# Patient Record
Sex: MALE | Race: ASIAN | NOT HISPANIC OR LATINO | URBAN - METROPOLITAN AREA
[De-identification: names, ages, dates, MRNs, and addresses within clinical notes are randomized per-mention and may not be internally consistent; named-entity substitution may affect disease eponyms.]

---

## 2019-07-13 ENCOUNTER — INPATIENT (INPATIENT)
Facility: HOSPITAL | Age: 84
LOS: 3 days | Discharge: HOME | DRG: 300 | End: 2019-07-17
Attending: INTERNAL MEDICINE | Admitting: INTERNAL MEDICINE
Payer: MEDICARE

## 2019-07-13 VITALS
TEMPERATURE: 98 F | RESPIRATION RATE: 18 BRPM | WEIGHT: 160.06 LBS | HEIGHT: 66 IN | DIASTOLIC BLOOD PRESSURE: 77 MMHG | SYSTOLIC BLOOD PRESSURE: 121 MMHG | OXYGEN SATURATION: 95 % | HEART RATE: 89 BPM

## 2019-07-13 DIAGNOSIS — K92.2 GASTROINTESTINAL HEMORRHAGE, UNSPECIFIED: ICD-10-CM

## 2019-07-13 DIAGNOSIS — I82.409 ACUTE EMBOLISM AND THROMBOSIS OF UNSPECIFIED DEEP VEINS OF UNSPECIFIED LOWER EXTREMITY: ICD-10-CM

## 2019-07-13 DIAGNOSIS — Z98.890 OTHER SPECIFIED POSTPROCEDURAL STATES: Chronic | ICD-10-CM

## 2019-07-13 DIAGNOSIS — Z29.9 ENCOUNTER FOR PROPHYLACTIC MEASURES, UNSPECIFIED: ICD-10-CM

## 2019-07-13 DIAGNOSIS — E78.00 PURE HYPERCHOLESTEROLEMIA, UNSPECIFIED: ICD-10-CM

## 2019-07-13 DIAGNOSIS — I10 ESSENTIAL (PRIMARY) HYPERTENSION: ICD-10-CM

## 2019-07-13 DIAGNOSIS — N40.0 BENIGN PROSTATIC HYPERPLASIA WITHOUT LOWER URINARY TRACT SYMPTOMS: ICD-10-CM

## 2019-07-13 LAB
ALBUMIN SERPL ELPH-MCNC: 3.7 G/DL — SIGNIFICANT CHANGE UP (ref 3.3–5)
ALP SERPL-CCNC: 79 U/L — SIGNIFICANT CHANGE UP (ref 40–120)
ALT FLD-CCNC: 12 U/L — SIGNIFICANT CHANGE UP (ref 10–45)
ANION GAP SERPL CALC-SCNC: 13 MMOL/L — SIGNIFICANT CHANGE UP (ref 5–17)
APPEARANCE UR: CLEAR — SIGNIFICANT CHANGE UP
APTT BLD: 31.1 SEC — SIGNIFICANT CHANGE UP (ref 27.5–36.3)
AST SERPL-CCNC: 18 U/L — SIGNIFICANT CHANGE UP (ref 10–40)
BACTERIA # UR AUTO: NEGATIVE — SIGNIFICANT CHANGE UP
BASE EXCESS BLDV CALC-SCNC: 1.7 MMOL/L — SIGNIFICANT CHANGE UP (ref -2–2)
BASOPHILS # BLD AUTO: 0 K/UL — SIGNIFICANT CHANGE UP (ref 0–0.2)
BASOPHILS NFR BLD AUTO: 0.5 % — SIGNIFICANT CHANGE UP (ref 0–2)
BILIRUB SERPL-MCNC: 0.6 MG/DL — SIGNIFICANT CHANGE UP (ref 0.2–1.2)
BILIRUB UR-MCNC: NEGATIVE — SIGNIFICANT CHANGE UP
BLD GP AB SCN SERPL QL: NEGATIVE — SIGNIFICANT CHANGE UP
BUN SERPL-MCNC: 8 MG/DL — SIGNIFICANT CHANGE UP (ref 7–23)
CA-I SERPL-SCNC: 1.17 MMOL/L — SIGNIFICANT CHANGE UP (ref 1.12–1.3)
CALCIUM SERPL-MCNC: 9 MG/DL — SIGNIFICANT CHANGE UP (ref 8.4–10.5)
CHLORIDE BLDV-SCNC: 104 MMOL/L — SIGNIFICANT CHANGE UP (ref 96–108)
CHLORIDE SERPL-SCNC: 101 MMOL/L — SIGNIFICANT CHANGE UP (ref 96–108)
CO2 BLDV-SCNC: 27 MMOL/L — SIGNIFICANT CHANGE UP (ref 22–30)
CO2 SERPL-SCNC: 23 MMOL/L — SIGNIFICANT CHANGE UP (ref 22–31)
COLOR SPEC: SIGNIFICANT CHANGE UP
CREAT SERPL-MCNC: 0.66 MG/DL — SIGNIFICANT CHANGE UP (ref 0.5–1.3)
DIFF PNL FLD: NEGATIVE — SIGNIFICANT CHANGE UP
EOSINOPHIL # BLD AUTO: 0.1 K/UL — SIGNIFICANT CHANGE UP (ref 0–0.5)
EOSINOPHIL NFR BLD AUTO: 2.8 % — SIGNIFICANT CHANGE UP (ref 0–6)
EPI CELLS # UR: 1 /HPF — SIGNIFICANT CHANGE UP
GAS PNL BLDV: 133 MMOL/L — LOW (ref 135–145)
GAS PNL BLDV: SIGNIFICANT CHANGE UP
GAS PNL BLDV: SIGNIFICANT CHANGE UP
GLUCOSE BLDV-MCNC: 104 MG/DL — HIGH (ref 70–99)
GLUCOSE SERPL-MCNC: 111 MG/DL — HIGH (ref 70–99)
GLUCOSE UR QL: NEGATIVE — SIGNIFICANT CHANGE UP
HCO3 BLDV-SCNC: 26 MMOL/L — SIGNIFICANT CHANGE UP (ref 21–29)
HCT VFR BLD CALC: 41.4 % — SIGNIFICANT CHANGE UP (ref 39–50)
HCT VFR BLDA CALC: 44 % — SIGNIFICANT CHANGE UP (ref 39–50)
HGB BLD CALC-MCNC: 14.5 G/DL — SIGNIFICANT CHANGE UP (ref 13–17)
HGB BLD-MCNC: 14.7 G/DL — SIGNIFICANT CHANGE UP (ref 13–17)
HYALINE CASTS # UR AUTO: 0 /LPF — SIGNIFICANT CHANGE UP (ref 0–2)
INR BLD: 0.94 RATIO — SIGNIFICANT CHANGE UP (ref 0.88–1.16)
KETONES UR-MCNC: NEGATIVE — SIGNIFICANT CHANGE UP
LACTATE BLDV-MCNC: 1.3 MMOL/L — SIGNIFICANT CHANGE UP (ref 0.7–2)
LEUKOCYTE ESTERASE UR-ACNC: NEGATIVE — SIGNIFICANT CHANGE UP
LYMPHOCYTES # BLD AUTO: 1.1 K/UL — SIGNIFICANT CHANGE UP (ref 1–3.3)
LYMPHOCYTES # BLD AUTO: 20.7 % — SIGNIFICANT CHANGE UP (ref 13–44)
MCHC RBC-ENTMCNC: 34.5 PG — HIGH (ref 27–34)
MCHC RBC-ENTMCNC: 35.6 GM/DL — SIGNIFICANT CHANGE UP (ref 32–36)
MCV RBC AUTO: 97 FL — SIGNIFICANT CHANGE UP (ref 80–100)
MONOCYTES # BLD AUTO: 0.5 K/UL — SIGNIFICANT CHANGE UP (ref 0–0.9)
MONOCYTES NFR BLD AUTO: 10.4 % — SIGNIFICANT CHANGE UP (ref 2–14)
NEUTROPHILS # BLD AUTO: 3.4 K/UL — SIGNIFICANT CHANGE UP (ref 1.8–7.4)
NEUTROPHILS NFR BLD AUTO: 65.6 % — SIGNIFICANT CHANGE UP (ref 43–77)
NITRITE UR-MCNC: NEGATIVE — SIGNIFICANT CHANGE UP
OB PNL STL: NEGATIVE — SIGNIFICANT CHANGE UP
OTHER CELLS CSF MANUAL: 18 ML/DL — SIGNIFICANT CHANGE UP (ref 18–22)
PCO2 BLDV: 41 MMHG — SIGNIFICANT CHANGE UP (ref 35–50)
PH BLDV: 7.41 — SIGNIFICANT CHANGE UP (ref 7.35–7.45)
PH UR: 6 — SIGNIFICANT CHANGE UP (ref 5–8)
PLATELET # BLD AUTO: 238 K/UL — SIGNIFICANT CHANGE UP (ref 150–400)
PO2 BLDV: 58 MMHG — HIGH (ref 25–45)
POTASSIUM BLDV-SCNC: 4.8 MMOL/L — SIGNIFICANT CHANGE UP (ref 3.5–5.3)
POTASSIUM SERPL-MCNC: 4 MMOL/L — SIGNIFICANT CHANGE UP (ref 3.5–5.3)
POTASSIUM SERPL-SCNC: 4 MMOL/L — SIGNIFICANT CHANGE UP (ref 3.5–5.3)
PROT SERPL-MCNC: 6.2 G/DL — SIGNIFICANT CHANGE UP (ref 6–8.3)
PROT UR-MCNC: NEGATIVE — SIGNIFICANT CHANGE UP
PROTHROM AB SERPL-ACNC: 10.7 SEC — SIGNIFICANT CHANGE UP (ref 10–12.9)
RBC # BLD: 4.27 M/UL — SIGNIFICANT CHANGE UP (ref 4.2–5.8)
RBC # FLD: 12.9 % — SIGNIFICANT CHANGE UP (ref 10.3–14.5)
RBC CASTS # UR COMP ASSIST: 1 /HPF — SIGNIFICANT CHANGE UP (ref 0–4)
RH IG SCN BLD-IMP: POSITIVE — SIGNIFICANT CHANGE UP
RH IG SCN BLD-IMP: POSITIVE — SIGNIFICANT CHANGE UP
SAO2 % BLDV: 90 % — HIGH (ref 67–88)
SODIUM SERPL-SCNC: 137 MMOL/L — SIGNIFICANT CHANGE UP (ref 135–145)
SP GR SPEC: 1.01 — SIGNIFICANT CHANGE UP (ref 1.01–1.02)
UROBILINOGEN FLD QL: NEGATIVE — SIGNIFICANT CHANGE UP
WBC # BLD: 5.2 K/UL — SIGNIFICANT CHANGE UP (ref 3.8–10.5)
WBC # FLD AUTO: 5.2 K/UL — SIGNIFICANT CHANGE UP (ref 3.8–10.5)
WBC UR QL: 1 /HPF — SIGNIFICANT CHANGE UP (ref 0–5)

## 2019-07-13 PROCEDURE — 71045 X-RAY EXAM CHEST 1 VIEW: CPT | Mod: 26

## 2019-07-13 PROCEDURE — 74178 CT ABD&PLV WO CNTR FLWD CNTR: CPT | Mod: 26

## 2019-07-13 PROCEDURE — 93010 ELECTROCARDIOGRAM REPORT: CPT

## 2019-07-13 PROCEDURE — 99285 EMERGENCY DEPT VISIT HI MDM: CPT | Mod: GC

## 2019-07-13 RX ORDER — PRIMIDONE 250 MG/1
50 TABLET ORAL
Refills: 0 | Status: DISCONTINUED | OUTPATIENT
Start: 2019-07-13 | End: 2019-07-17

## 2019-07-13 RX ORDER — PANTOPRAZOLE SODIUM 20 MG/1
80 TABLET, DELAYED RELEASE ORAL ONCE
Refills: 0 | Status: COMPLETED | OUTPATIENT
Start: 2019-07-13 | End: 2019-07-13

## 2019-07-13 RX ORDER — HEPARIN SODIUM 5000 [USP'U]/ML
3000 INJECTION INTRAVENOUS; SUBCUTANEOUS EVERY 6 HOURS
Refills: 0 | Status: DISCONTINUED | OUTPATIENT
Start: 2019-07-13 | End: 2019-07-15

## 2019-07-13 RX ORDER — HEPARIN SODIUM 5000 [USP'U]/ML
6000 INJECTION INTRAVENOUS; SUBCUTANEOUS EVERY 6 HOURS
Refills: 0 | Status: DISCONTINUED | OUTPATIENT
Start: 2019-07-13 | End: 2019-07-15

## 2019-07-13 RX ORDER — HEPARIN SODIUM 5000 [USP'U]/ML
6000 INJECTION INTRAVENOUS; SUBCUTANEOUS EVERY 6 HOURS
Refills: 0 | Status: DISCONTINUED | OUTPATIENT
Start: 2019-07-13 | End: 2019-07-13

## 2019-07-13 RX ORDER — PANTOPRAZOLE SODIUM 20 MG/1
8 TABLET, DELAYED RELEASE ORAL
Qty: 80 | Refills: 0 | Status: DISCONTINUED | OUTPATIENT
Start: 2019-07-13 | End: 2019-07-14

## 2019-07-13 RX ORDER — HEPARIN SODIUM 5000 [USP'U]/ML
3000 INJECTION INTRAVENOUS; SUBCUTANEOUS EVERY 6 HOURS
Refills: 0 | Status: DISCONTINUED | OUTPATIENT
Start: 2019-07-13 | End: 2019-07-13

## 2019-07-13 RX ORDER — ONDANSETRON 8 MG/1
4 TABLET, FILM COATED ORAL EVERY 8 HOURS
Refills: 0 | Status: DISCONTINUED | OUTPATIENT
Start: 2019-07-13 | End: 2019-07-17

## 2019-07-13 RX ORDER — HYDROCHLOROTHIAZIDE 25 MG
12.5 TABLET ORAL DAILY
Refills: 0 | Status: DISCONTINUED | OUTPATIENT
Start: 2019-07-13 | End: 2019-07-13

## 2019-07-13 RX ORDER — TAMSULOSIN HYDROCHLORIDE 0.4 MG/1
0.4 CAPSULE ORAL AT BEDTIME
Refills: 0 | Status: DISCONTINUED | OUTPATIENT
Start: 2019-07-13 | End: 2019-07-17

## 2019-07-13 RX ORDER — MORPHINE SULFATE 50 MG/1
4 CAPSULE, EXTENDED RELEASE ORAL ONCE
Refills: 0 | Status: DISCONTINUED | OUTPATIENT
Start: 2019-07-13 | End: 2019-07-13

## 2019-07-13 RX ORDER — HEPARIN SODIUM 5000 [USP'U]/ML
INJECTION INTRAVENOUS; SUBCUTANEOUS
Qty: 25000 | Refills: 0 | Status: DISCONTINUED | OUTPATIENT
Start: 2019-07-13 | End: 2019-07-13

## 2019-07-13 RX ORDER — LOSARTAN POTASSIUM 100 MG/1
100 TABLET, FILM COATED ORAL DAILY
Refills: 0 | Status: DISCONTINUED | OUTPATIENT
Start: 2019-07-13 | End: 2019-07-17

## 2019-07-13 RX ORDER — ATORVASTATIN CALCIUM 80 MG/1
10 TABLET, FILM COATED ORAL AT BEDTIME
Refills: 0 | Status: DISCONTINUED | OUTPATIENT
Start: 2019-07-13 | End: 2019-07-17

## 2019-07-13 RX ORDER — AMLODIPINE BESYLATE 2.5 MG/1
10 TABLET ORAL AT BEDTIME
Refills: 0 | Status: DISCONTINUED | OUTPATIENT
Start: 2019-07-13 | End: 2019-07-13

## 2019-07-13 RX ORDER — HEPARIN SODIUM 5000 [USP'U]/ML
INJECTION INTRAVENOUS; SUBCUTANEOUS
Qty: 25000 | Refills: 0 | Status: DISCONTINUED | OUTPATIENT
Start: 2019-07-13 | End: 2019-07-15

## 2019-07-13 RX ORDER — ESCITALOPRAM OXALATE 10 MG/1
10 TABLET, FILM COATED ORAL DAILY
Refills: 0 | Status: DISCONTINUED | OUTPATIENT
Start: 2019-07-13 | End: 2019-07-17

## 2019-07-13 RX ADMIN — LOSARTAN POTASSIUM 100 MILLIGRAM(S): 100 TABLET, FILM COATED ORAL at 22:11

## 2019-07-13 RX ADMIN — MORPHINE SULFATE 4 MILLIGRAM(S): 50 CAPSULE, EXTENDED RELEASE ORAL at 15:27

## 2019-07-13 RX ADMIN — PANTOPRAZOLE SODIUM 80 MILLIGRAM(S): 20 TABLET, DELAYED RELEASE ORAL at 15:28

## 2019-07-13 RX ADMIN — TAMSULOSIN HYDROCHLORIDE 0.4 MILLIGRAM(S): 0.4 CAPSULE ORAL at 22:11

## 2019-07-13 RX ADMIN — ATORVASTATIN CALCIUM 10 MILLIGRAM(S): 80 TABLET, FILM COATED ORAL at 22:11

## 2019-07-13 RX ADMIN — MORPHINE SULFATE 4 MILLIGRAM(S): 50 CAPSULE, EXTENDED RELEASE ORAL at 16:00

## 2019-07-13 RX ADMIN — PANTOPRAZOLE SODIUM 10 MG/HR: 20 TABLET, DELAYED RELEASE ORAL at 23:00

## 2019-07-13 RX ADMIN — HEPARIN SODIUM 1300 UNIT(S)/HR: 5000 INJECTION INTRAVENOUS; SUBCUTANEOUS at 22:10

## 2019-07-13 RX ADMIN — PRIMIDONE 50 MILLIGRAM(S): 250 TABLET ORAL at 23:01

## 2019-07-13 NOTE — ED ADULT NURSE REASSESSMENT NOTE - NS ED NURSE REASSESS COMMENT FT1
KALEIGH Dooley made aware of patients corrected weight and need for new heparin order. NP acknowledged and will put in new order

## 2019-07-13 NOTE — H&P ADULT - PROBLEM SELECTOR PLAN 2
seen on CT   ? GIB  hold AC for now till GI eval  vascular eval for  possible IVC filter seen on CT   ? GIB  will start AC in view of acute DVt, monitor for bleeding and acute drop in Hg level

## 2019-07-13 NOTE — ED PROVIDER NOTE - ATTENDING CONTRIBUTION TO CARE
85M brought in by family for episodes of intermittent dark tarry stools x 1 week, per family he has had difficulty having BM but per granddaughter (who is a nurse) noted dark stools and stated patient complained of lower abdominal pain, mild, non-radiating, denies exacerbating/alleviating factors. Had similar episode in the past 2 years ago and was hospitalized for it. Does not take blood thinners.    ROS:  GEN: (-) fevers/chills, (-) weight loss, (-) fatigue/malaise  HEENT: (-) visual change, (-) photophobia  NECK: (-) stiffness, (-) swelling  RESP: (-) shortness of breath, (-) cough, (-) sputum  CV: (-) chest pain, (-) palpitations  GI: (-) nausea, (-) vomiting, (-) pain, (-) constipation, (-) diarrhea, (+) melena, (-) BRBPR  : (-) frequency/urgency, (-) hematuria, (-) dysuria, (-) incontinence, (-) discharge  EXT: (-) edema, (-) cyanosis  ENDO: (-) heat/cold intolerance, (-) polyuria  NEURO: (-) paresthesias, (-) weakness, (-) headache, (+) dizziness, (-) syncope  MSK: no recent trauma, no muscle pain     PMHx: HTN, HLD, prior episode of melena (reportedly had EGD/.colonoscophy 2 years ago per family), BPH, Depression  PSHx: Spinal surgery  Allergies: NKDA  SocHx: Denies ETOH/drugs/smoking    VITALS REVIEWED.    GENERALIZED APPEARANCE:  Comfortable, no acute distress, lying in bed, appears pale  SKIN:  Warm, dry, no cyanosis  HEAD:  No obvious scalp lesions  EYES:  Conjunctiva slightly pale, no icterus  ENMT:  Mucus membranes moist, no stridor, PERRL, EOMI  NECK:  Supple, non-tender  CHEST AND RESPIRATORY:  Clear to auscultation B/L, good air entry B/L, equal chest expansion  HEART AND CARDIOVASCULAR:  Regular rate, no obvious murmur  ABDOMEN AND GI:  Soft, non-tender, non-distended.  No rebound, no guarding  EXTREMITIES:  No deformity, edema, or calf tenderness  NEURO: AAOx3, gross motor and sensory intact    Impression:  R/o GIB, r/o anemia, r/o electrolyte abnormality, r/o demand ischemia  Labs, imaging/CTA, IV PPI, EKG, admit

## 2019-07-13 NOTE — ED PROVIDER NOTE - PHYSICAL EXAMINATION
GENERAL: no acute distress, non-toxic appearing  HEAD: normocephalic, atraumatic  HEENT: normal conjunctiva, oral mucosa moist, neck supple  CARDIAC: regular rate and rhythm, normal S1 and S2,  no appreciable murmurs  PULM: clear to ascultation bilaterally, no crackles, rales, rhonchi, or wheezing  GI: ttp over suprapubic region and RLQ  NEURO: alert and oriented x 3, normal speech, PERRLA, EOMI, no focal motor or sensory deficits  MSK: no visible deformities, no peripheral edema, calf tenderness/redness/swelling  SKIN: no visible rashes, dry, well-perfused  PSYCH: appropriate mood and affect

## 2019-07-13 NOTE — CHART NOTE - NSCHARTNOTEFT_GEN_A_CORE
Interval events  Patient s/p DVT and GIB  CBC stable, FOBT negative  Heparin gtt full AC started by Attending, Heparin gtt re ordered as patient's weight was entered incorrect before.  Monitor for Bleed  F/U 10 pm CXBC  Sign out to day NP    Karon Hodges FNP BC  [id number]

## 2019-07-13 NOTE — ED ADULT NURSE NOTE - OBJECTIVE STATEMENT
Patient presents to Ed with c/o abd back pain and dark stool. Patient with hx of chronic back pain due to meg placed 30 years ago  here due to dark stool one week ago and no bowel movement x 1 week. Patient A&Ox3 speaks Ukrainian, reports lightheadedness  and decreased po intake, denies chest pain +sob on exertion, no nausea vomiting fever chills headache or dizziness walks with a cane.  Lungs cta bilat abd soft with diffuse tenderness. Lt hand 20g iv lock placed labs drawn and sent.

## 2019-07-13 NOTE — H&P ADULT - NSICDXPASTMEDICALHX_GEN_ALL_CORE_FT
PAST MEDICAL HISTORY:  BPH (benign prostatic hyperplasia)     Depression     High cholesterol     Hypertension

## 2019-07-13 NOTE — H&P ADULT - NSHPLABSRESULTS_GEN_ALL_CORE
14.7   5.2   )-----------( 238      ( 2019 15:24 )             41.4               07-13    137  |  101  |  8   ----------------------------<  111<H>  4.0   |  23  |  0.66    Ca    9.0      2019 15:24    TPro  6.2  /  Alb  3.7  /  TBili  0.6  /  DBili  x   /  AST  18  /  ALT  12  /  AlkPhos  79  07-13    PT/INR - ( 2019 15:24 )   PT: 10.7 sec;   INR: 0.94 ratio         PTT - ( 2019 15:24 )  PTT:31.1 sec                   Urinalysis Basic - ( 2019 16:54 )    Color: Light Yellow / Appearance: Clear / S.010 / pH: x  Gluc: x / Ketone: Negative  / Bili: Negative / Urobili: Negative   Blood: x / Protein: Negative / Nitrite: Negative   Leuk Esterase: Negative / RBC: 1 /hpf / WBC 1 /HPF   Sq Epi: x / Non Sq Epi: 1 /hpf / Bacteria: Negative 14.7   5.2   )-----------( 238      ( 2019 15:24 )             41.4               07-13    137  |  101  |  8   ----------------------------<  111<H>  4.0   |  23  |  0.66    Ca    9.0      2019 15:24    TPro  6.2  /  Alb  3.7  /  TBili  0.6  /  DBili  x   /  AST  18  /  ALT  12  /  AlkPhos  79  07-13    PT/INR - ( 2019 15:24 )   PT: 10.7 sec;   INR: 0.94 ratio         PTT - ( 2019 15:24 )  PTT:31.1 sec                   Urinalysis Basic - ( 2019 16:54 )    Color: Light Yellow / Appearance: Clear / S.010 / pH: x  Gluc: x / Ketone: Negative  / Bili: Negative / Urobili: Negative   Blood: x / Protein: Negative / Nitrite: Negative   Leuk Esterase: Negative / RBC: 1 /hpf / WBC 1 /HPF   Sq Epi: x / Non Sq Epi: 1 /hpf / Bacteria: Negative    IMPRESSION:     No definite evidence of active gastrointestinal bleeding. However,   evaluation of the colon is somewhat limited as described above..    Celiac artery, SMA and JENNIE are patent    Deep venous thrombosis in the left external iliac vein.

## 2019-07-13 NOTE — H&P ADULT - HISTORY OF PRESENT ILLNESS
Pt is a 84 Y/O Male with hx of HTN, HLD presenting with intermittent black, tarry stools x 1 week with associated lower abdominal pain and nausea. No vomiting, fever, chills. No dysuria, frequency. No blood thinners. No chest pain, shortness of breath. Endorses intermittent lightheadedness. Has chronic back pain. Pt is a 86 Y/O Male with hx of HTN, HLD presenting with intermittent black, tarry stools x 1 week with associated lower abdominal pain and nausea. No vomiting, fever, chills. No dysuria, frequency. No blood thinners. No chest pain, shortness of breath. Endorses intermittent lightheadedness. Has chronic back pain. in the ER he was found to have DVT of LE with stable Hg level.

## 2019-07-13 NOTE — CONSULT NOTE ADULT - SUBJECTIVE AND OBJECTIVE BOX
Patient is a 85y Male     Patient is a 85y old  Male who presents with a chief complaint of GIB (13 Jul 2019 19:02)      HPI:  Pt is a 86 Y/O Male with hx of HTN, HLD presenting with intermittent black, tarry stools x 1 week with associated lower abdominal pain and nausea. No vomiting, fever, chills. No dysuria, frequency. No blood thinners. No chest pain, shortness of breath. Endorses intermittent lightheadedness. Has chronic back pain. in the ER he was found to have DVT of LE with stable Hg level. (13 Jul 2019 19:02)      PAST MEDICAL & SURGICAL HISTORY:  BPH (benign prostatic hyperplasia)  Depression  High cholesterol  Hypertension  H/O Spinal surgery      MEDICATIONS  (STANDING):  atorvastatin 10 milliGRAM(s) Oral at bedtime  escitalopram 10 milliGRAM(s) Oral daily  heparin  Infusion.  Unit(s)/Hr (11 mL/Hr) IV Continuous <Continuous>  losartan 100 milliGRAM(s) Oral daily  pantoprazole Infusion 8 mG/Hr (10 mL/Hr) IV Continuous <Continuous>  primidone 50 milliGRAM(s) Oral two times a day  tamsulosin 0.4 milliGRAM(s) Oral at bedtime      Allergies    No Known Allergies    Intolerances        SOCIAL HISTORY:  Denies ETOh,Smoking,     FAMILY HISTORY:      REVIEW OF SYSTEMS:    CONSTITUTIONAL: No weakness, fevers or chills  EYES/ENT: No visual changes;  No vertigo or throat pain   NECK: No pain or stiffness  RESPIRATORY: No cough, wheezing, hemoptysis; No shortness of breath  CARDIOVASCULAR: No chest pain or palpitations  GASTROINTESTINAL: No abdominal or epigastric pain. No nausea, vomiting, or hematemesis; No diarrhea or constipation. No melena or hematochezia.  GENITOURINARY: No dysuria, frequency or hematuria  NEUROLOGICAL: No numbness or weakness  SKIN: No itching, burning, rashes, or lesions   All other review of systems is negative unless indicated above.    VITAL:  T(C): , Max: 36.9 (07-13-19 @ 14:26)  T(F): , Max: 98.4 (07-13-19 @ 14:26)  HR: 78 (07-13-19 @ 19:33)  BP: 133/82 (07-13-19 @ 19:33)  BP(mean): --  RR: 18 (07-13-19 @ 19:33)  SpO2: 95% (07-13-19 @ 19:33)  Wt(kg): --    I and O's:    Height (cm): 167.64 (07-13 @ 14:26)  Weight (kg): 64.8 (07-13 @ 20:04)  BMI (kg/m2): 23.1 (07-13 @ 20:04)  BSA (m2): 1.73 (07-13 @ 20:04)    PHYSICAL EXAM:    Constitutional: NAD  HEENT: PERRLA,   Neck: No JVD  Respiratory: CTA B/L  Cardiovascular: S1 and S2  Gastrointestinal: BS+, soft, NT/ND  Extremities: No peripheral edema  Neurological: A/O x 3, no focal deficits  Psychiatric: Normal mood, normal affect  : No Yang  Skin: No rashes  Access: Not applicable  Back: No CVA tenderness    LABS:                        14.7   5.2   )-----------( 238      ( 13 Jul 2019 15:24 )             41.4     07-13    137  |  101  |  8   ----------------------------<  111<H>  4.0   |  23  |  0.66    Ca    9.0      13 Jul 2019 15:24    TPro  6.2  /  Alb  3.7  /  TBili  0.6  /  DBili  x   /  AST  18  /  ALT  12  /  AlkPhos  79  07-13          RADIOLOGY & ADDITIONAL STUDIES:

## 2019-07-13 NOTE — ED ADULT NURSE REASSESSMENT NOTE - NS ED NURSE REASSESS COMMENT FT1
Patient received from ROSMERY Almaraz. Patient a&ox3, no acute distress, resp nonlabored, resting comfortably in bed with family at bedside. Denies headache, dizziness, chest pain, palpitations, SOB, abd pain, N/V/D, urinary symptoms, fevers, chills, weakness at this time. Patient awaiting bed assignment. Pt placed in position of comfort. Pt educated on call bell system and provided call bell. Bed in lowest position, wheels locked, appropriate side rails raised. Pt denies needs at this time.

## 2019-07-13 NOTE — H&P ADULT - ASSESSMENT
Pt is a 86 Y/O Male with hx of HTN, HLD presenting with intermittent black, tarry stools x 1 week with associated lower abdominal pain and nausea. No vomiting, fever, chills. No dysuria, frequency. No blood thinners. No chest pain, shortness of breath. Endorses intermittent lightheadedness. Has chronic back pain. in the ER he was found to have DVT of LE with stable Hg level.

## 2019-07-13 NOTE — H&P ADULT - NSHPPHYSICALEXAM_GEN_ALL_CORE
Vital Signs Last 24 Hrs  T(C): 36.8 (13 Jul 2019 15:25), Max: 36.9 (13 Jul 2019 14:26)  T(F): 98.2 (13 Jul 2019 15:25), Max: 98.4 (13 Jul 2019 14:26)  HR: 91 (13 Jul 2019 15:25) (89 - 91)  BP: 129/84 (13 Jul 2019 15:25) (121/77 - 129/84)  BP(mean): --  RR: 18 (13 Jul 2019 15:25) (18 - 18)  SpO2: 94% (13 Jul 2019 15:25) (94% - 95%) Vital Signs Last 24 Hrs  T(C): 36.8 (13 Jul 2019 15:25), Max: 36.9 (13 Jul 2019 14:26)  T(F): 98.2 (13 Jul 2019 15:25), Max: 98.4 (13 Jul 2019 14:26)  HR: 91 (13 Jul 2019 15:25) (89 - 91)  BP: 129/84 (13 Jul 2019 15:25) (121/77 - 129/84)  BP(mean): --  RR: 18 (13 Jul 2019 15:25) (18 - 18)  SpO2: 94% (13 Jul 2019 15:25) (94% - 95%)    Appearance: Normal	  HEENT:   Normal oral mucosa, PERRL, EOMI	  Lymphatic: No lymphadenopathy , no edema  Cardiovascular: Normal S1 S2, No JVD, No murmurs , Peripheral pulses palpable 2+ bilaterally  Respiratory: Lungs clear to auscultation, normal effort 	  Gastrointestinal:  Soft, Non-tender, + BS	  Skin: No rashes, No ecchymoses, No cyanosis, warm to touch  Musculoskeletal: Normal range of motion, normal strength  Psychiatry:  Mood & affect appropriate  Ext: No edema

## 2019-07-13 NOTE — ED ADULT NURSE NOTE - NSIMPLEMENTINTERV_GEN_ALL_ED
Implemented All Fall with Harm Risk Interventions:  [place] to call system. Call bell, personal items and telephone within reach. Instruct patient to call for assistance. Room bathroom lighting operational. Non-slip footwear when patient is off stretcher. Physically safe environment: no spills, clutter or unnecessary equipment. Stretcher in lowest position, wheels locked, appropriate side rails in place. Provide visual cue, wrist band, yellow gown, etc. Monitor gait and stability. Monitor for mental status changes and reorient to person, place, and time. Review medications for side effects contributing to fall risk. Reinforce activity limits and safety measures with patient and family. Provide visual clues: red socks.

## 2019-07-13 NOTE — ED PROVIDER NOTE - CLINICAL SUMMARY MEDICAL DECISION MAKING FREE TEXT BOX
85M with hx of HTN, HLD presenting with intermittent black, tarry stools x 1 week with associated lower abdominal pain and nausea. Plan - basics, type and screen, guaic, cta a/p, protonix.

## 2019-07-13 NOTE — CONSULT NOTE ADULT - ASSESSMENT
shiloh is 3dsu nurse at bedside. no bm for three days. doubt gib and guiac negative. ppi and a/c approrpatie. we will followwith you  endocopic evaluation only if active bleeding

## 2019-07-13 NOTE — ED PROVIDER NOTE - PROGRESS NOTE DETAILS
Moses: spoke with PCP, Dr. Hobbs, at [phone], who advised admitting to hospitalist if patient requires it. mukul: admitted to [facility] for possible GIB, and newly diagnosed DVT. Stable for the floors.

## 2019-07-13 NOTE — ED ADULT NURSE NOTE - PMH
High cholesterol    Hypertension BPH (benign prostatic hyperplasia)    Depression    High cholesterol    Hypertension

## 2019-07-13 NOTE — H&P ADULT - PROBLEM SELECTOR PLAN 1
dark?black stoola s per family Hg stable  protonix drip  GI eval  Liquid diet   trend CBC  active type and screen dark?black stool as per family Hg stable  protonix drip  Stool occult negative   GI eval  Liquid diet   trend CBC  active type and screen

## 2019-07-13 NOTE — ED PROVIDER NOTE - OBJECTIVE STATEMENT
85M with hx of HTN, HLD presenting with intermittent black, tarry stools x 1 week with associated lower abdominal pain and nausea. No vomiting, fever, chills. No dysuria, frequency. No blood thinners. No chest pain, shortness of breath. Endorses intermittent lightheadedness. Has chronic back pain.

## 2019-07-13 NOTE — ED PROVIDER NOTE - NS ED ROS FT
GENERAL: no fever, chills  HEENT: no cough, congestion, odynophagia, dysphagia  CARDIAC: + lightheadedness  PULM: no dyspnea, wheezing   GI: + abdominal pain, nausea, melena  : no urinary dysuria, frequency, incontinence, hematuria  NEURO: no headache, motor weakness, sensory changes  MSK: + chronic back pain  SKIN: no rashes, hives  HEME: no active bleeding, bruising

## 2019-07-14 LAB
ALBUMIN SERPL ELPH-MCNC: 3.1 G/DL — LOW (ref 3.3–5)
ALP SERPL-CCNC: 65 U/L — SIGNIFICANT CHANGE UP (ref 40–120)
ALT FLD-CCNC: 10 U/L — SIGNIFICANT CHANGE UP (ref 10–45)
ANION GAP SERPL CALC-SCNC: 14 MMOL/L — SIGNIFICANT CHANGE UP (ref 5–17)
APTT BLD: 136.7 SEC — CRITICAL HIGH (ref 27.5–36.3)
APTT BLD: 154.8 SEC — CRITICAL HIGH (ref 27.5–36.3)
APTT BLD: 163.9 SEC — CRITICAL HIGH (ref 27.5–36.3)
AST SERPL-CCNC: 18 U/L — SIGNIFICANT CHANGE UP (ref 10–40)
BILIRUB SERPL-MCNC: 0.5 MG/DL — SIGNIFICANT CHANGE UP (ref 0.2–1.2)
BUN SERPL-MCNC: 7 MG/DL — SIGNIFICANT CHANGE UP (ref 7–23)
CALCIUM SERPL-MCNC: 8.7 MG/DL — SIGNIFICANT CHANGE UP (ref 8.4–10.5)
CHLORIDE SERPL-SCNC: 102 MMOL/L — SIGNIFICANT CHANGE UP (ref 96–108)
CHOLEST SERPL-MCNC: 120 MG/DL — SIGNIFICANT CHANGE UP (ref 10–199)
CO2 SERPL-SCNC: 21 MMOL/L — LOW (ref 22–31)
CREAT SERPL-MCNC: 0.6 MG/DL — SIGNIFICANT CHANGE UP (ref 0.5–1.3)
CULTURE RESULTS: SIGNIFICANT CHANGE UP
GLUCOSE SERPL-MCNC: 85 MG/DL — SIGNIFICANT CHANGE UP (ref 70–99)
HBA1C BLD-MCNC: 5.8 % — HIGH (ref 4–5.6)
HCT VFR BLD CALC: 36.8 % — LOW (ref 39–50)
HCT VFR BLD CALC: 37.2 % — LOW (ref 39–50)
HCT VFR BLD CALC: 37.2 % — LOW (ref 39–50)
HCT VFR BLD CALC: 38.1 % — LOW (ref 39–50)
HCT VFR BLD CALC: 40.4 % — SIGNIFICANT CHANGE UP (ref 39–50)
HDLC SERPL-MCNC: 49 MG/DL — SIGNIFICANT CHANGE UP
HGB BLD-MCNC: 12.3 G/DL — LOW (ref 13–17)
HGB BLD-MCNC: 12.6 G/DL — LOW (ref 13–17)
HGB BLD-MCNC: 13.1 G/DL — SIGNIFICANT CHANGE UP (ref 13–17)
HGB BLD-MCNC: 13.1 G/DL — SIGNIFICANT CHANGE UP (ref 13–17)
HGB BLD-MCNC: 13.3 G/DL — SIGNIFICANT CHANGE UP (ref 13–17)
LIPID PNL WITH DIRECT LDL SERPL: 63 MG/DL — SIGNIFICANT CHANGE UP
MCHC RBC-ENTMCNC: 28.5 PG — SIGNIFICANT CHANGE UP (ref 27–34)
MCHC RBC-ENTMCNC: 31.9 PG — SIGNIFICANT CHANGE UP (ref 27–34)
MCHC RBC-ENTMCNC: 31.9 PG — SIGNIFICANT CHANGE UP (ref 27–34)
MCHC RBC-ENTMCNC: 32.9 GM/DL — SIGNIFICANT CHANGE UP (ref 32–36)
MCHC RBC-ENTMCNC: 33.1 GM/DL — SIGNIFICANT CHANGE UP (ref 32–36)
MCHC RBC-ENTMCNC: 33.5 PG — SIGNIFICANT CHANGE UP (ref 27–34)
MCHC RBC-ENTMCNC: 34.1 PG — HIGH (ref 27–34)
MCHC RBC-ENTMCNC: 34.2 GM/DL — SIGNIFICANT CHANGE UP (ref 32–36)
MCHC RBC-ENTMCNC: 34.4 GM/DL — SIGNIFICANT CHANGE UP (ref 32–36)
MCHC RBC-ENTMCNC: 35.2 GM/DL — SIGNIFICANT CHANGE UP (ref 32–36)
MCV RBC AUTO: 86.7 FL — SIGNIFICANT CHANGE UP (ref 80–100)
MCV RBC AUTO: 93.2 FL — SIGNIFICANT CHANGE UP (ref 80–100)
MCV RBC AUTO: 96.4 FL — SIGNIFICANT CHANGE UP (ref 80–100)
MCV RBC AUTO: 97.1 FL — SIGNIFICANT CHANGE UP (ref 80–100)
MCV RBC AUTO: 97.2 FL — SIGNIFICANT CHANGE UP (ref 80–100)
PLATELET # BLD AUTO: 211 K/UL — SIGNIFICANT CHANGE UP (ref 150–400)
PLATELET # BLD AUTO: 224 K/UL — SIGNIFICANT CHANGE UP (ref 150–400)
PLATELET # BLD AUTO: 228 K/UL — SIGNIFICANT CHANGE UP (ref 150–400)
PLATELET # BLD AUTO: 234 K/UL — SIGNIFICANT CHANGE UP (ref 150–400)
PLATELET # BLD AUTO: 240 K/UL — SIGNIFICANT CHANGE UP (ref 150–400)
POTASSIUM SERPL-MCNC: 4 MMOL/L — SIGNIFICANT CHANGE UP (ref 3.5–5.3)
POTASSIUM SERPL-SCNC: 4 MMOL/L — SIGNIFICANT CHANGE UP (ref 3.5–5.3)
PROT SERPL-MCNC: 5.2 G/DL — LOW (ref 6–8.3)
RBC # BLD: 3.83 M/UL — LOW (ref 4.2–5.8)
RBC # BLD: 3.86 M/UL — LOW (ref 4.2–5.8)
RBC # BLD: 3.92 M/UL — LOW (ref 4.2–5.8)
RBC # BLD: 3.95 M/UL — LOW (ref 4.2–5.8)
RBC # BLD: 4.66 M/UL — SIGNIFICANT CHANGE UP (ref 4.2–5.8)
RBC # FLD: 12.5 % — SIGNIFICANT CHANGE UP (ref 10.3–14.5)
RBC # FLD: 12.7 % — SIGNIFICANT CHANGE UP (ref 10.3–14.5)
RBC # FLD: 13.2 % — SIGNIFICANT CHANGE UP (ref 10.3–14.5)
RBC # FLD: 13.2 % — SIGNIFICANT CHANGE UP (ref 10.3–14.5)
RBC # FLD: 13.3 % — SIGNIFICANT CHANGE UP (ref 10.3–14.5)
RH IG SCN BLD-IMP: POSITIVE — SIGNIFICANT CHANGE UP
SODIUM SERPL-SCNC: 137 MMOL/L — SIGNIFICANT CHANGE UP (ref 135–145)
SPECIMEN SOURCE: SIGNIFICANT CHANGE UP
TOTAL CHOLESTEROL/HDL RATIO MEASUREMENT: 2.5 RATIO — LOW (ref 3.4–9.6)
TRIGL SERPL-MCNC: 41 MG/DL — SIGNIFICANT CHANGE UP (ref 10–149)
TSH SERPL-MCNC: 1.64 UIU/ML — SIGNIFICANT CHANGE UP (ref 0.27–4.2)
WBC # BLD: 10.33 K/UL — SIGNIFICANT CHANGE UP (ref 3.8–10.5)
WBC # BLD: 4.36 K/UL — SIGNIFICANT CHANGE UP (ref 3.8–10.5)
WBC # BLD: 4.5 K/UL — SIGNIFICANT CHANGE UP (ref 3.8–10.5)
WBC # BLD: 4.59 K/UL — SIGNIFICANT CHANGE UP (ref 3.8–10.5)
WBC # BLD: 5 K/UL — SIGNIFICANT CHANGE UP (ref 3.8–10.5)
WBC # FLD AUTO: 10.33 K/UL — SIGNIFICANT CHANGE UP (ref 3.8–10.5)
WBC # FLD AUTO: 4.36 K/UL — SIGNIFICANT CHANGE UP (ref 3.8–10.5)
WBC # FLD AUTO: 4.5 K/UL — SIGNIFICANT CHANGE UP (ref 3.8–10.5)
WBC # FLD AUTO: 4.59 K/UL — SIGNIFICANT CHANGE UP (ref 3.8–10.5)
WBC # FLD AUTO: 5 K/UL — SIGNIFICANT CHANGE UP (ref 3.8–10.5)

## 2019-07-14 RX ORDER — ACETAMINOPHEN 500 MG
650 TABLET ORAL EVERY 6 HOURS
Refills: 0 | Status: DISCONTINUED | OUTPATIENT
Start: 2019-07-14 | End: 2019-07-17

## 2019-07-14 RX ORDER — OXYCODONE AND ACETAMINOPHEN 5; 325 MG/1; MG/1
1 TABLET ORAL EVERY 6 HOURS
Refills: 0 | Status: DISCONTINUED | OUTPATIENT
Start: 2019-07-14 | End: 2019-07-14

## 2019-07-14 RX ORDER — ACETAMINOPHEN 500 MG
650 TABLET ORAL ONCE
Refills: 0 | Status: COMPLETED | OUTPATIENT
Start: 2019-07-14 | End: 2019-07-14

## 2019-07-14 RX ORDER — MORPHINE SULFATE 50 MG/1
15 CAPSULE, EXTENDED RELEASE ORAL EVERY 6 HOURS
Refills: 0 | Status: DISCONTINUED | OUTPATIENT
Start: 2019-07-14 | End: 2019-07-17

## 2019-07-14 RX ORDER — PANTOPRAZOLE SODIUM 20 MG/1
40 TABLET, DELAYED RELEASE ORAL
Refills: 0 | Status: DISCONTINUED | OUTPATIENT
Start: 2019-07-14 | End: 2019-07-16

## 2019-07-14 RX ADMIN — Medication 650 MILLIGRAM(S): at 06:00

## 2019-07-14 RX ADMIN — PRIMIDONE 50 MILLIGRAM(S): 250 TABLET ORAL at 18:40

## 2019-07-14 RX ADMIN — Medication 650 MILLIGRAM(S): at 10:35

## 2019-07-14 RX ADMIN — MORPHINE SULFATE 15 MILLIGRAM(S): 50 CAPSULE, EXTENDED RELEASE ORAL at 13:30

## 2019-07-14 RX ADMIN — HEPARIN SODIUM 0 UNIT(S)/HR: 5000 INJECTION INTRAVENOUS; SUBCUTANEOUS at 12:36

## 2019-07-14 RX ADMIN — ATORVASTATIN CALCIUM 10 MILLIGRAM(S): 80 TABLET, FILM COATED ORAL at 21:18

## 2019-07-14 RX ADMIN — PRIMIDONE 50 MILLIGRAM(S): 250 TABLET ORAL at 06:01

## 2019-07-14 RX ADMIN — HEPARIN SODIUM 0 UNIT(S)/HR: 5000 INJECTION INTRAVENOUS; SUBCUTANEOUS at 20:21

## 2019-07-14 RX ADMIN — MORPHINE SULFATE 15 MILLIGRAM(S): 50 CAPSULE, EXTENDED RELEASE ORAL at 12:47

## 2019-07-14 RX ADMIN — PANTOPRAZOLE SODIUM 10 MG/HR: 20 TABLET, DELAYED RELEASE ORAL at 10:23

## 2019-07-14 RX ADMIN — TAMSULOSIN HYDROCHLORIDE 0.4 MILLIGRAM(S): 0.4 CAPSULE ORAL at 21:18

## 2019-07-14 RX ADMIN — HEPARIN SODIUM 0 UNIT(S)/HR: 5000 INJECTION INTRAVENOUS; SUBCUTANEOUS at 04:50

## 2019-07-14 RX ADMIN — Medication 650 MILLIGRAM(S): at 11:35

## 2019-07-14 RX ADMIN — ESCITALOPRAM OXALATE 10 MILLIGRAM(S): 10 TABLET, FILM COATED ORAL at 12:38

## 2019-07-14 RX ADMIN — PANTOPRAZOLE SODIUM 40 MILLIGRAM(S): 20 TABLET, DELAYED RELEASE ORAL at 18:40

## 2019-07-14 RX ADMIN — HEPARIN SODIUM 1100 UNIT(S)/HR: 5000 INJECTION INTRAVENOUS; SUBCUTANEOUS at 05:55

## 2019-07-14 RX ADMIN — HEPARIN SODIUM 900 UNIT(S)/HR: 5000 INJECTION INTRAVENOUS; SUBCUTANEOUS at 13:38

## 2019-07-14 RX ADMIN — HEPARIN SODIUM 700 UNIT(S)/HR: 5000 INJECTION INTRAVENOUS; SUBCUTANEOUS at 21:29

## 2019-07-14 RX ADMIN — LOSARTAN POTASSIUM 100 MILLIGRAM(S): 100 TABLET, FILM COATED ORAL at 06:01

## 2019-07-14 NOTE — PROGRESS NOTE ADULT - PROBLEM SELECTOR PLAN 2
seen on CT   Cont heparin drip   will start AC in view of acute DVt, monitor for bleeding and acute drop in Hg level

## 2019-07-14 NOTE — PROGRESS NOTE ADULT - ASSESSMENT
Pt is a 84 Y/O Male with hx of HTN, HLD presenting with intermittent black, tarry stools x 1 week with associated lower abdominal pain and nausea. No vomiting, fever, chills. No dysuria, frequency. No blood thinners. No chest pain, shortness of breath. Endorses intermittent lightheadedness. Has chronic back pain. in the ER he was found to have DVT of LE with stable Hg level.

## 2019-07-14 NOTE — PROGRESS NOTE ADULT - SUBJECTIVE AND OBJECTIVE BOX
Subjective: Patient seen and examined. No new events except as noted.   doing well  hg stable   back pain controlled with meds       REVIEW OF SYSTEMS:    CONSTITUTIONAL: No weakness, fevers or chills  EYES/ENT: No visual changes;  No vertigo or throat pain   NECK: No pain or stiffness  RESPIRATORY: No cough, wheezing, hemoptysis; No shortness of breath  CARDIOVASCULAR: No chest pain or palpitations  GASTROINTESTINAL: No abdominal or epigastric pain.   GENITOURINARY: No dysuria, frequency or hematuria  NEUROLOGICAL: No numbness or weakness  SKIN: No itching, burning, rashes, or lesions   All other review of systems is negative unless indicated above.    MEDICATIONS:  MEDICATIONS  (STANDING):  atorvastatin 10 milliGRAM(s) Oral at bedtime  escitalopram 10 milliGRAM(s) Oral daily  heparin  Infusion.  Unit(s)/Hr (13 mL/Hr) IV Continuous <Continuous>  losartan 100 milliGRAM(s) Oral daily  pantoprazole  Injectable 40 milliGRAM(s) IV Push two times a day  primidone 50 milliGRAM(s) Oral two times a day  tamsulosin 0.4 milliGRAM(s) Oral at bedtime      PHYSICAL EXAM:  T(C): 36.9 (19 @ 08:42), Max: 36.9 (19 @ 14:26)  HR: 96 (19 @ 08:42) (78 - 96)  BP: 117/76 (19 @ 08:42) (117/76 - 133/82)  RR: 18 (19 @ 08:42) (18 - 18)  SpO2: 94% (19 @ 08:42) (93% - 97%)  Wt(kg): --  I&O's Summary    2019 07:  -  2019 07:00  --------------------------------------------------------  IN: 833 mL / OUT: 900 mL / NET: -67 mL    2019 07:  -  2019 12:25  --------------------------------------------------------  IN: 240 mL / OUT: 125 mL / NET: 115 mL      Height (cm): 165.1 ( @ 21:06)  Weight (kg): 73 ( @ 21:06)  BMI (kg/m2): 26.8 ( @ 21:06)  BSA (m2): 1.8 ( @ 21:06)    Appearance: Normal	  HEENT:   Normal oral mucosa, PERRL, EOMI	  Lymphatic: No lymphadenopathy , no edema  Cardiovascular: Normal S1 S2, No JVD  Respiratory: Lungs clear to auscultation, normal effort 	  Gastrointestinal:  Soft, Non-tender, + BS	  Skin: No rashes, No ecchymoses, No cyanosis, warm to touch  Musculoskeletal: Normal range of motion, normal strength  Psychiatry:  Mood & affect appropriate  Ext: No edema      All labs, Imaging and EKGs personally reviewed                           12.3   4.59  )-----------( 234      ( 2019 09:53 )             37.2               07-    137  |  102  |  7   ----------------------------<  85  4.0   |  21<L>  |  0.60    Ca    8.7      2019 08:41    TPro  5.2<L>  /  Alb  3.1<L>  /  TBili  0.5  /  DBili  x   /  AST  18  /  ALT  10  /  AlkPhos  65  07-    PT/INR - ( 2019 15:24 )   PT: 10.7 sec;   INR: 0.94 ratio         PTT - ( 2019 04:07 )  PTT:163.9 sec                   Urinalysis Basic - ( 2019 16:54 )    Color: Light Yellow / Appearance: Clear / S.010 / pH: x  Gluc: x / Ketone: Negative  / Bili: Negative / Urobili: Negative   Blood: x / Protein: Negative / Nitrite: Negative   Leuk Esterase: Negative / RBC: 1 /hpf / WBC 1 /HPF   Sq Epi: x / Non Sq Epi: 1 /hpf / Bacteria: Negative

## 2019-07-14 NOTE — PROVIDER CONTACT NOTE (CRITICAL VALUE NOTIFICATION) - ACTION/TREATMENT ORDERED:
As per provider NP Mar, adhere to heparin nomogram protocol. Continue to monitor pt. As per provider KALEIGH Quinonez , adhere to heparin nomogram protocol. Continue to monitor pt.

## 2019-07-14 NOTE — PROGRESS NOTE ADULT - SUBJECTIVE AND OBJECTIVE BOX
LESLI YANG:[id number],   85yMale followed for:  No Known Allergies    PAST MEDICAL & SURGICAL HISTORY:  BPH (benign prostatic hyperplasia)  Depression  High cholesterol  Hypertension  H/O Spinal surgery    FAMILY HISTORY:    MEDICATIONS  (STANDING):  atorvastatin 10 milliGRAM(s) Oral at bedtime  escitalopram 10 milliGRAM(s) Oral daily  heparin  Infusion.  Unit(s)/Hr (13 mL/Hr) IV Continuous <Continuous>  losartan 100 milliGRAM(s) Oral daily  pantoprazole  Injectable 40 milliGRAM(s) IV Push two times a day  primidone 50 milliGRAM(s) Oral two times a day  tamsulosin 0.4 milliGRAM(s) Oral at bedtime    MEDICATIONS  (PRN):  acetaminophen   Tablet .. 650 milliGRAM(s) Oral every 6 hours PRN Mild Pain (1 - 3), Moderate Pain (4 - 6)  heparin  Injectable 6000 Unit(s) IV Push every 6 hours PRN For aPTT less than 40  heparin  Injectable 3000 Unit(s) IV Push every 6 hours PRN For aPTT between 40 - 57  morphine  IR 15 milliGRAM(s) Oral every 6 hours PRN Severe Pain (7 - 10)  ondansetron Injectable 4 milliGRAM(s) IV Push every 8 hours PRN Nausea and/or Vomiting      Vital Signs Last 24 Hrs  T(C): 36.8 (14 Jul 2019 16:18), Max: 36.9 (13 Jul 2019 19:33)  T(F): 98.2 (14 Jul 2019 16:18), Max: 98.5 (14 Jul 2019 08:42)  HR: 85 (14 Jul 2019 16:18) (78 - 96)  BP: 121/67 (14 Jul 2019 16:18) (117/76 - 133/82)  BP(mean): --  RR: 18 (14 Jul 2019 16:18) (18 - 18)  SpO2: 97% (14 Jul 2019 16:18) (93% - 97%)  nc/at  s1s2  cta  soft, nt, nd no guarding or rebound  no c/c/e    CBC Full  -  ( 14 Jul 2019 11:56 )  WBC Count : 10.33 K/uL  RBC Count : 4.66 M/uL  Hemoglobin : 13.3 g/dL  Hematocrit : 40.4 %  Platelet Count - Automated : 211 K/uL  Mean Cell Volume : 86.7 fl  Mean Cell Hemoglobin : 28.5 pg  Mean Cell Hemoglobin Concentration : 32.9 gm/dL  Auto Neutrophil # : x  Auto Lymphocyte # : x  Auto Monocyte # : x  Auto Eosinophil # : x  Auto Basophil # : x  Auto Neutrophil % : x  Auto Lymphocyte % : x  Auto Monocyte % : x  Auto Eosinophil % : x  Auto Basophil % : x    07-14    137  |  102  |  7   ----------------------------<  85  4.0   |  21<L>  |  0.60    Ca    8.7      14 Jul 2019 08:41    TPro  5.2<L>  /  Alb  3.1<L>  /  TBili  0.5  /  DBili  x   /  AST  18  /  ALT  10  /  AlkPhos  65  07-14    PT/INR - ( 13 Jul 2019 15:24 )   PT: 10.7 sec;   INR: 0.94 ratio         PTT - ( 14 Jul 2019 11:59 )  PTT:154.8 sec

## 2019-07-14 NOTE — CONSULT NOTE ADULT - SUBJECTIVE AND OBJECTIVE BOX
CHIEF COMPLAINT:Patient is a 85y old  Male who presents with a chief complaint of GIB (14 Jul 2019 19:15)      HISTORY OF PRESENT ILLNESS:HPI:  Pt is a 84 Y/O Male with hx of HTN, HLD presenting with intermittent black, tarry stools x 1 week with associated lower abdominal pain and nausea. No vomiting, fever, chills. No dysuria, frequency. No blood thinners. No chest pain, shortness of breath. Endorses intermittent lightheadedness. Has chronic back pain. in the ER he was found to have DVT of LE with stable Hg level. (13 Jul 2019 19:02)      PAST MEDICAL & SURGICAL HISTORY:  BPH (benign prostatic hyperplasia)  Depression  High cholesterol  Hypertension  H/O Spinal surgery          MEDICATIONS:  heparin  Infusion.  Unit(s)/Hr IV Continuous <Continuous>  heparin  Injectable 6000 Unit(s) IV Push every 6 hours PRN  heparin  Injectable 3000 Unit(s) IV Push every 6 hours PRN  losartan 100 milliGRAM(s) Oral daily  tamsulosin 0.4 milliGRAM(s) Oral at bedtime        acetaminophen   Tablet .. 650 milliGRAM(s) Oral every 6 hours PRN  escitalopram 10 milliGRAM(s) Oral daily  morphine  IR 15 milliGRAM(s) Oral every 6 hours PRN  ondansetron Injectable 4 milliGRAM(s) IV Push every 8 hours PRN  primidone 50 milliGRAM(s) Oral two times a day    pantoprazole  Injectable 40 milliGRAM(s) IV Push two times a day    atorvastatin 10 milliGRAM(s) Oral at bedtime        FAMILY HISTORY:      Non-contributory    SOCIAL HISTORY:    not an active smoker    Allergies    No Known Allergies    Intolerances    	    REVIEW OF SYSTEMS:  CONSTITUTIONAL: No fever  EYES: No eye pain, visual disturbances, or discharge  ENMT:  No difficulty hearing, tinnitus  NECK: No pain or stiffness  RESPIRATORY: No cough, wheezing,  CARDIOVASCULAR: No chest pain, palpitations, passing out, dizziness, or leg swelling  GASTROINTESTINAL:  See HPI.  GENITOURINARY: No dysuria, hematuria  NEUROLOGICAL: No stroke like symptoms  SKIN: No burning or lesions   ENDOCRINE: No heat or cold intolerance  MUSCULOSKELETAL: No joint pain or swelling  PSYCHIATRIC: No  anxiety, mood swings  HEME/LYMPH: No bleeding gums  ALLERY AND IMMUNOLOGIC: No hives or eczema	    All other ROS negative    PHYSICAL EXAM:  T(C): 36.5 (07-14-19 @ 20:46), Max: 36.9 (07-14-19 @ 00:54)  HR: 81 (07-14-19 @ 20:46) (80 - 96)  BP: 123/74 (07-14-19 @ 20:46) (117/76 - 126/77)  RR: 18 (07-14-19 @ 20:46) (18 - 18)  SpO2: 97% (07-14-19 @ 20:46) (93% - 97%)  Wt(kg): --  I&O's Summary    13 Jul 2019 07:01  -  14 Jul 2019 07:00  --------------------------------------------------------  IN: 833 mL / OUT: 900 mL / NET: -67 mL    14 Jul 2019 07:01  -  14 Jul 2019 22:52  --------------------------------------------------------  IN: 580 mL / OUT: 200 mL / NET: 380 mL        Appearance: Normal	  HEENT:   Normal oral mucosa, EOMI	  Cardiovascular: Normal S1 S2, No JVD, No murmurs  Respiratory: Lungs clear to auscultation	  Psychiatry: Alert  Gastrointestinal:  Soft, Non-tender, + BS	  Skin: No rashes   Neurologic: Non-focal  Extremities:  No edema  Vascular: Peripheral pulses palpable    	    	  	  CARDIAC MARKERS:                                  13.3   10.33 )-----------( 211      ( 14 Jul 2019 11:56 )             40.4     07-14    137  |  102  |  7   ----------------------------<  85  4.0   |  21<L>  |  0.60    Ca    8.7      14 Jul 2019 08:41    TPro  5.2<L>  /  Alb  3.1<L>  /  TBili  0.5  /  DBili  x   /  AST  18  /  ALT  10  /  AlkPhos  65  07-14          EKG: nsr, inferior wall q waves  Radiology:  < from: Xray Chest 1 View AP/PA (07.13.19 @ 17:50) >  IMPRESSION:     Mild pulmonary edema.    < end of copied text >      Assessment and Plan:   · Assessment		  Pt is a 84 Y/O Male with hx of HTN, HLD presenting with intermittent black, tarry stools x 1 week with associated lower abdominal pain and nausea. No vomiting, fever, chills. No dysuria, frequency. No blood thinners. No chest pain, shortness of breath. Endorses intermittent lightheadedness. Has chronic back pain. in the ER he was found to have DVT of LE with stable Hg level.      Problem/Plan - 1:  ·  Problem: DVT (deep venous thrombosis).  Plan: seen on CT   Cont heparin drip   Transition to NOAC prior to discharge  Likely 2/2 immobility, Heme consult for hypercoag state work up     Problem/Plan - 2:  ·  Problem: Hypertension.  Plan: c/w Losartan 100mg       Problem/Plan - 3:  ·  Problem: High cholesterol.  Plan: c/w statin.      Problem/Plan - 4:  ·  Problem: Pulm edema on CXR. Plan: Asx, Obtain TTE     Problem/Plan - 5:  Problem: Prophylactic measure. Plan: DVT and GI PPX   pain and nausea meds PRN.        Robert Robertson DO [facility]  [facility]  [phone]

## 2019-07-14 NOTE — PROGRESS NOTE ADULT - PROBLEM SELECTOR PLAN 1
dark?black stool as per family Hg stable  protonix changed to BID   Stool occult negative   GI eval appreciated   diet as tolerated, can be advanced   trend CBC  active type and screen

## 2019-07-14 NOTE — CHART NOTE - NSCHARTNOTEFT_GEN_A_CORE
H&H 14.7/41.4 on admission (7/13/19 at 3PM), checked on 7/14 4AM H&H 13.1/38.1, pt on heparin drip for DVT, but admitted with tarry stools, occult blood neg, no active bleeding at this time. D/w the Attending, Dr. Bashir, will recheck CBC at 10 AM, and 8PM. Will endorse to day team.       Daniela Hurd NP, #[id number]

## 2019-07-15 DIAGNOSIS — E04.1 NONTOXIC SINGLE THYROID NODULE: ICD-10-CM

## 2019-07-15 LAB
APTT BLD: 69.3 SEC — HIGH (ref 27.5–36.3)
APTT BLD: 71.4 SEC — HIGH (ref 27.5–36.3)
HCT VFR BLD CALC: 38.9 % — LOW (ref 39–50)
HCT VFR BLD CALC: 39.4 % — SIGNIFICANT CHANGE UP (ref 39–50)
HGB BLD-MCNC: 12.8 G/DL — LOW (ref 13–17)
HGB BLD-MCNC: 13.4 G/DL — SIGNIFICANT CHANGE UP (ref 13–17)
MCHC RBC-ENTMCNC: 31.3 PG — SIGNIFICANT CHANGE UP (ref 27–34)
MCHC RBC-ENTMCNC: 32.9 GM/DL — SIGNIFICANT CHANGE UP (ref 32–36)
MCHC RBC-ENTMCNC: 33.1 PG — SIGNIFICANT CHANGE UP (ref 27–34)
MCHC RBC-ENTMCNC: 34.1 GM/DL — SIGNIFICANT CHANGE UP (ref 32–36)
MCV RBC AUTO: 95.1 FL — SIGNIFICANT CHANGE UP (ref 80–100)
MCV RBC AUTO: 97.1 FL — SIGNIFICANT CHANGE UP (ref 80–100)
PLATELET # BLD AUTO: 231 K/UL — SIGNIFICANT CHANGE UP (ref 150–400)
PLATELET # BLD AUTO: 239 K/UL — SIGNIFICANT CHANGE UP (ref 150–400)
RBC # BLD: 4.06 M/UL — LOW (ref 4.2–5.8)
RBC # BLD: 4.09 M/UL — LOW (ref 4.2–5.8)
RBC # FLD: 12.7 % — SIGNIFICANT CHANGE UP (ref 10.3–14.5)
RBC # FLD: 13.2 % — SIGNIFICANT CHANGE UP (ref 10.3–14.5)
WBC # BLD: 4.55 K/UL — SIGNIFICANT CHANGE UP (ref 3.8–10.5)
WBC # BLD: 4.8 K/UL — SIGNIFICANT CHANGE UP (ref 3.8–10.5)
WBC # FLD AUTO: 4.55 K/UL — SIGNIFICANT CHANGE UP (ref 3.8–10.5)
WBC # FLD AUTO: 4.8 K/UL — SIGNIFICANT CHANGE UP (ref 3.8–10.5)

## 2019-07-15 PROCEDURE — 71250 CT THORAX DX C-: CPT | Mod: 26

## 2019-07-15 PROCEDURE — 76536 US EXAM OF HEAD AND NECK: CPT | Mod: 26

## 2019-07-15 RX ORDER — APIXABAN 2.5 MG/1
10 TABLET, FILM COATED ORAL EVERY 12 HOURS
Refills: 0 | Status: DISCONTINUED | OUTPATIENT
Start: 2019-07-15 | End: 2019-07-17

## 2019-07-15 RX ADMIN — PRIMIDONE 50 MILLIGRAM(S): 250 TABLET ORAL at 18:34

## 2019-07-15 RX ADMIN — Medication 650 MILLIGRAM(S): at 19:24

## 2019-07-15 RX ADMIN — ATORVASTATIN CALCIUM 10 MILLIGRAM(S): 80 TABLET, FILM COATED ORAL at 21:57

## 2019-07-15 RX ADMIN — HEPARIN SODIUM 700 UNIT(S)/HR: 5000 INJECTION INTRAVENOUS; SUBCUTANEOUS at 10:48

## 2019-07-15 RX ADMIN — MORPHINE SULFATE 15 MILLIGRAM(S): 50 CAPSULE, EXTENDED RELEASE ORAL at 15:00

## 2019-07-15 RX ADMIN — LOSARTAN POTASSIUM 100 MILLIGRAM(S): 100 TABLET, FILM COATED ORAL at 05:34

## 2019-07-15 RX ADMIN — MORPHINE SULFATE 15 MILLIGRAM(S): 50 CAPSULE, EXTENDED RELEASE ORAL at 21:59

## 2019-07-15 RX ADMIN — ESCITALOPRAM OXALATE 10 MILLIGRAM(S): 10 TABLET, FILM COATED ORAL at 14:00

## 2019-07-15 RX ADMIN — PRIMIDONE 50 MILLIGRAM(S): 250 TABLET ORAL at 05:34

## 2019-07-15 RX ADMIN — MORPHINE SULFATE 15 MILLIGRAM(S): 50 CAPSULE, EXTENDED RELEASE ORAL at 05:34

## 2019-07-15 RX ADMIN — MORPHINE SULFATE 15 MILLIGRAM(S): 50 CAPSULE, EXTENDED RELEASE ORAL at 14:00

## 2019-07-15 RX ADMIN — MORPHINE SULFATE 15 MILLIGRAM(S): 50 CAPSULE, EXTENDED RELEASE ORAL at 22:35

## 2019-07-15 RX ADMIN — Medication 650 MILLIGRAM(S): at 18:35

## 2019-07-15 RX ADMIN — PANTOPRAZOLE SODIUM 40 MILLIGRAM(S): 20 TABLET, DELAYED RELEASE ORAL at 18:36

## 2019-07-15 RX ADMIN — MORPHINE SULFATE 15 MILLIGRAM(S): 50 CAPSULE, EXTENDED RELEASE ORAL at 06:04

## 2019-07-15 RX ADMIN — HEPARIN SODIUM 700 UNIT(S)/HR: 5000 INJECTION INTRAVENOUS; SUBCUTANEOUS at 04:04

## 2019-07-15 RX ADMIN — APIXABAN 10 MILLIGRAM(S): 2.5 TABLET, FILM COATED ORAL at 18:34

## 2019-07-15 RX ADMIN — TAMSULOSIN HYDROCHLORIDE 0.4 MILLIGRAM(S): 0.4 CAPSULE ORAL at 21:57

## 2019-07-15 RX ADMIN — PANTOPRAZOLE SODIUM 40 MILLIGRAM(S): 20 TABLET, DELAYED RELEASE ORAL at 05:34

## 2019-07-15 NOTE — DIETITIAN INITIAL EVALUATION ADULT. - PROBLEM SELECTOR PLAN 2
seen on CT   ? GIB  will start AC in view of acute DVt, monitor for bleeding and acute drop in Hg level

## 2019-07-15 NOTE — DIETITIAN INITIAL EVALUATION ADULT. - ADD RECOMMEND
Monitor/encourage PO intake. Assist patient with menus,  tray prep. ,Encourage family to assist patient with foods preferences.

## 2019-07-15 NOTE — PROGRESS NOTE ADULT - ASSESSMENT
mild epigastric pain, continue ppi therapy. no sign of gib. no plan for endoscopy with negative guiac.

## 2019-07-15 NOTE — DIETITIAN INITIAL EVALUATION ADULT. - FACTORS AFF FOOD INTAKE
change in sense of smell or taste/patient states he has no taste; also tels family he doesn't want anything however agreed to Italian Ice when asked

## 2019-07-15 NOTE — PROGRESS NOTE ADULT - SUBJECTIVE AND OBJECTIVE BOX
LESLI YANG:[id number],   85yMale followed for:  No Known Allergies    PAST MEDICAL & SURGICAL HISTORY:  BPH (benign prostatic hyperplasia)  Depression  High cholesterol  Hypertension  H/O Spinal surgery    FAMILY HISTORY:    MEDICATIONS  (STANDING):  atorvastatin 10 milliGRAM(s) Oral at bedtime  escitalopram 10 milliGRAM(s) Oral daily  heparin  Infusion.  Unit(s)/Hr (13 mL/Hr) IV Continuous <Continuous>  losartan 100 milliGRAM(s) Oral daily  pantoprazole  Injectable 40 milliGRAM(s) IV Push two times a day  primidone 50 milliGRAM(s) Oral two times a day  tamsulosin 0.4 milliGRAM(s) Oral at bedtime    MEDICATIONS  (PRN):  acetaminophen   Tablet .. 650 milliGRAM(s) Oral every 6 hours PRN Mild Pain (1 - 3), Moderate Pain (4 - 6)  heparin  Injectable 6000 Unit(s) IV Push every 6 hours PRN For aPTT less than 40  heparin  Injectable 3000 Unit(s) IV Push every 6 hours PRN For aPTT between 40 - 57  morphine  IR 15 milliGRAM(s) Oral every 6 hours PRN Severe Pain (7 - 10)  ondansetron Injectable 4 milliGRAM(s) IV Push every 8 hours PRN Nausea and/or Vomiting      Vital Signs Last 24 Hrs  T(C): 37.2 (15 Jul 2019 07:42), Max: 37.2 (15 Jul 2019 07:42)  T(F): 98.9 (15 Jul 2019 07:42), Max: 98.9 (15 Jul 2019 07:42)  HR: 86 (15 Jul 2019 07:42) (81 - 96)  BP: 146/78 (15 Jul 2019 07:42) (117/76 - 147/83)  BP(mean): --  RR: 17 (15 Jul 2019 07:42) (17 - 18)  SpO2: 95% (15 Jul 2019 07:42) (94% - 97%)  nc/at  s1s2  cta  soft, nt, nd no guarding or rebound  no c/c/e    CBC Full  -  ( 15 Jul 2019 03:13 )  WBC Count : 4.8 K/uL  RBC Count : 4.06 M/uL  Hemoglobin : 13.4 g/dL  Hematocrit : 39.4 %  Platelet Count - Automated : 231 K/uL  Mean Cell Volume : 97.1 fl  Mean Cell Hemoglobin : 33.1 pg  Mean Cell Hemoglobin Concentration : 34.1 gm/dL  Auto Neutrophil # : x  Auto Lymphocyte # : x  Auto Monocyte # : x  Auto Eosinophil # : x  Auto Basophil # : x  Auto Neutrophil % : x  Auto Lymphocyte % : x  Auto Monocyte % : x  Auto Eosinophil % : x  Auto Basophil % : x    07-14    137  |  102  |  7   ----------------------------<  85  4.0   |  21<L>  |  0.60    Ca    8.7      14 Jul 2019 08:41    TPro  5.2<L>  /  Alb  3.1<L>  /  TBili  0.5  /  DBili  x   /  AST  18  /  ALT  10  /  AlkPhos  65  07-14    PT/INR - ( 13 Jul 2019 15:24 )   PT: 10.7 sec;   INR: 0.94 ratio         PTT - ( 15 Jul 2019 03:13 )  PTT:69.3 sec

## 2019-07-15 NOTE — DIETITIAN INITIAL EVALUATION ADULT. - OTHER INFO
Patient visited at bedside. Patient's son and daughter- in- law present, describing patient as not eating well 5 days prior to admission without explanation. Patient admitted from home where he lives with his wife who cooks family meals. Family act as  per patient preference. Patient denies nausea, vomiting, abdominal pain or diarrhea.   Per patient's son , patient's usual weight is 150lbs. Patient has own teeth, states he has trouble chewing, per grand daughter ( also on scene) patient doesn't need diet change, but after discussion agreed to try chopped foods. Patient visited at bedside. Patient's son and daughter- in- law present, describing patient as not eating well 5 days prior to admission without explanation. Patient admitted from home where he lives with his wife who cooks family meals. Family act as  per patient preference. Patient denies nausea, vomiting, abdominal pain or diarrhea.   Per patient's son , patient's usual weight is 150lbs. Patient has own teeth, states he has trouble chewing, per grand daughter ( also on scene) patient doesn't need diet change, but after discussion agreed to try chopped foods. Per patient's son patient has no difficulty chewing.

## 2019-07-15 NOTE — CHART NOTE - NSCHARTNOTEFT_GEN_A_CORE
follow up- per d/w attending MD, d/c heparin and start pt on eliquis for left external iliac DVT; f/w pt family.  Samanta Malone(NP)  [facility], [phone]

## 2019-07-15 NOTE — PROGRESS NOTE ADULT - SUBJECTIVE AND OBJECTIVE BOX
Subjective: Patient seen and examined. No new events except as noted.   doing well  family agreed for AC   change to eliquis     REVIEW OF SYSTEMS:    CONSTITUTIONAL: No weakness, fevers or chills  EYES/ENT: No visual changes;  No vertigo or throat pain   NECK: No pain or stiffness  RESPIRATORY: No cough, wheezing, hemoptysis; No shortness of breath  CARDIOVASCULAR: No chest pain or palpitations  GASTROINTESTINAL: No abdominal or epigastric pain. No nausea, vomiting, or hematemesis; No diarrhea or constipation. No melena or hematochezia.  GENITOURINARY: No dysuria, frequency or hematuria  NEUROLOGICAL: No numbness or weakness  SKIN: No itching, burning, rashes, or lesions   All other review of systems is negative unless indicated above.    MEDICATIONS:  MEDICATIONS  (STANDING):  apixaban 10 milliGRAM(s) Oral every 12 hours  atorvastatin 10 milliGRAM(s) Oral at bedtime  escitalopram 10 milliGRAM(s) Oral daily  losartan 100 milliGRAM(s) Oral daily  pantoprazole  Injectable 40 milliGRAM(s) IV Push two times a day  primidone 50 milliGRAM(s) Oral two times a day  tamsulosin 0.4 milliGRAM(s) Oral at bedtime      PHYSICAL EXAM:  T(C): 36.9 (07-15-19 @ 11:30), Max: 37.2 (07-15-19 @ 07:42)  HR: 85 (07-15-19 @ 11:30) (81 - 91)  BP: 118/67 (07-15-19 @ 11:30) (118/67 - 147/83)  RR: 18 (07-15-19 @ 11:30) (17 - 18)  SpO2: 97% (07-15-19 @ 11:30) (95% - 97%)  Wt(kg): --  I&O's Summary    2019 07:  -  15 Jul 2019 07:00  --------------------------------------------------------  IN: 1130 mL / OUT: 600 mL / NET: 530 mL    15 Jul 2019 07:  -  15 Jul 2019 15:22  --------------------------------------------------------  IN: 480 mL / OUT: 700 mL / NET: -220 mL          Appearance: Normal	  HEENT:   Normal oral mucosa, PERRL, EOMI	  Lymphatic: No lymphadenopathy , no edema  Cardiovascular: Normal S1 S2, No JVD, No murmurs , Peripheral pulses palpable 2+ bilaterally  Respiratory: Lungs clear to auscultation, normal effort 	  Gastrointestinal:  Soft, Non-tender, + BS	  Skin: No rashes, No ecchymoses, No cyanosis, warm to touch  Musculoskeletal: back pain   Psychiatry:  Mood & affect appropriate  Ext: No edema      All labs, Imaging and EKGs personally reviewed                           12.8   55  )-----------( 239      ( 15 Jul 2019 08:32 )             38.9               07-14    137  |  102  |  7   ----------------------------<  85  4.0   |  21<L>  |  0.60    Ca    8.7      2019 08:41    TPro  5.2<L>  /  Alb  3.1<L>  /  TBili  0.5  /  DBili  x   /  AST  18  /  ALT  10  /  AlkPhos  65  07-14    PT/INR - ( 2019 15:24 )   PT: 10.7 sec;   INR: 0.94 ratio         PTT - ( 15 Jul 2019 10:17 )  PTT:71.4 sec                   Urinalysis Basic - ( 2019 16:54 )    Color: Light Yellow / Appearance: Clear / S.010 / pH: x  Gluc: x / Ketone: Negative  / Bili: Negative / Urobili: Negative   Blood: x / Protein: Negative / Nitrite: Negative   Leuk Esterase: Negative / RBC: 1 /hpf / WBC 1 /HPF   Sq Epi: x / Non Sq Epi: 1 /hpf / Bacteria: Negative    < from: CT Chest No Cont (07.15.19 @ 08:55) >    IMPRESSION:     No findings to suggest intrathoracic malignancy or metastasis.

## 2019-07-15 NOTE — PROGRESS NOTE ADULT - PROBLEM SELECTOR PLAN 1
dark?black stool as per family Hg stable  protonix  Stool occult negative   GI eval appreciated   diet as tolerated, can be advanced   trend CBC  active type and screen  no indication  for EGD colonoscopy

## 2019-07-15 NOTE — DIETITIAN INITIAL EVALUATION ADULT. - PROBLEM SELECTOR PLAN 1
dark?black stool as per family Hg stable  protonix drip  Stool occult negative   GI eval  Liquid diet   trend CBC  active type and screen

## 2019-07-15 NOTE — DIETITIAN INITIAL EVALUATION ADULT. - ENERGY NEEDS
86 Y/O Male with hx of HTN, HLD presenting with intermittent black, tarry stools x 1 week with associated lower abdominal pain and nausea. No vomiting, fever, chills. No dysuria, frequency. No blood thinners. No chest pain, shortness of breath. Endorses intermittent lightheadedness. Has chronic back pain. in the ER he was found to have DVT of LE with stable Hg level.

## 2019-07-16 ENCOUNTER — TRANSCRIPTION ENCOUNTER (OUTPATIENT)
Age: 84
End: 2019-07-16

## 2019-07-16 LAB
ANION GAP SERPL CALC-SCNC: 12 MMOL/L — SIGNIFICANT CHANGE UP (ref 5–17)
BUN SERPL-MCNC: 8 MG/DL — SIGNIFICANT CHANGE UP (ref 7–23)
CALCIUM SERPL-MCNC: 8.3 MG/DL — LOW (ref 8.4–10.5)
CHLORIDE SERPL-SCNC: 95 MMOL/L — LOW (ref 96–108)
CO2 SERPL-SCNC: 26 MMOL/L — SIGNIFICANT CHANGE UP (ref 22–31)
CREAT SERPL-MCNC: 0.6 MG/DL — SIGNIFICANT CHANGE UP (ref 0.5–1.3)
GLUCOSE SERPL-MCNC: 91 MG/DL — SIGNIFICANT CHANGE UP (ref 70–99)
HCT VFR BLD CALC: 39.8 % — SIGNIFICANT CHANGE UP (ref 39–50)
HGB BLD-MCNC: 13.1 G/DL — SIGNIFICANT CHANGE UP (ref 13–17)
MCHC RBC-ENTMCNC: 31.5 PG — SIGNIFICANT CHANGE UP (ref 27–34)
MCHC RBC-ENTMCNC: 32.9 GM/DL — SIGNIFICANT CHANGE UP (ref 32–36)
MCV RBC AUTO: 95.7 FL — SIGNIFICANT CHANGE UP (ref 80–100)
PLATELET # BLD AUTO: 241 K/UL — SIGNIFICANT CHANGE UP (ref 150–400)
POTASSIUM SERPL-MCNC: 4 MMOL/L — SIGNIFICANT CHANGE UP (ref 3.5–5.3)
POTASSIUM SERPL-SCNC: 4 MMOL/L — SIGNIFICANT CHANGE UP (ref 3.5–5.3)
RBC # BLD: 4.16 M/UL — LOW (ref 4.2–5.8)
RBC # FLD: 13.4 % — SIGNIFICANT CHANGE UP (ref 10.3–14.5)
SODIUM SERPL-SCNC: 133 MMOL/L — LOW (ref 135–145)
WBC # BLD: 6.05 K/UL — SIGNIFICANT CHANGE UP (ref 3.8–10.5)
WBC # FLD AUTO: 6.05 K/UL — SIGNIFICANT CHANGE UP (ref 3.8–10.5)

## 2019-07-16 RX ORDER — SENNA PLUS 8.6 MG/1
2 TABLET ORAL
Qty: 0 | Refills: 0 | DISCHARGE
Start: 2019-07-16

## 2019-07-16 RX ORDER — POLYETHYLENE GLYCOL 3350 17 G/17G
17 POWDER, FOR SOLUTION ORAL DAILY
Refills: 0 | Status: DISCONTINUED | OUTPATIENT
Start: 2019-07-16 | End: 2019-07-17

## 2019-07-16 RX ORDER — LACTULOSE 10 G/15ML
10 SOLUTION ORAL
Refills: 0 | Status: DISCONTINUED | OUTPATIENT
Start: 2019-07-16 | End: 2019-07-17

## 2019-07-16 RX ORDER — ACETAMINOPHEN 500 MG
2 TABLET ORAL
Qty: 0 | Refills: 0 | DISCHARGE
Start: 2019-07-16

## 2019-07-16 RX ORDER — TAMSULOSIN HYDROCHLORIDE 0.4 MG/1
1 CAPSULE ORAL
Qty: 0 | Refills: 0 | DISCHARGE
Start: 2019-07-16

## 2019-07-16 RX ORDER — DOCUSATE SODIUM 100 MG
1 CAPSULE ORAL
Qty: 0 | Refills: 0 | DISCHARGE
Start: 2019-07-16

## 2019-07-16 RX ORDER — SIMETHICONE 80 MG/1
80 TABLET, CHEWABLE ORAL EVERY 4 HOURS
Refills: 0 | Status: DISCONTINUED | OUTPATIENT
Start: 2019-07-16 | End: 2019-07-17

## 2019-07-16 RX ORDER — LOSARTAN POTASSIUM 100 MG/1
1 TABLET, FILM COATED ORAL
Qty: 0 | Refills: 0 | DISCHARGE
Start: 2019-07-16

## 2019-07-16 RX ORDER — DOCUSATE SODIUM 100 MG
100 CAPSULE ORAL
Refills: 0 | Status: DISCONTINUED | OUTPATIENT
Start: 2019-07-16 | End: 2019-07-17

## 2019-07-16 RX ORDER — ESCITALOPRAM OXALATE 10 MG/1
1 TABLET, FILM COATED ORAL
Qty: 0 | Refills: 0 | DISCHARGE
Start: 2019-07-16

## 2019-07-16 RX ORDER — POLYETHYLENE GLYCOL 3350 17 G/17G
17 POWDER, FOR SOLUTION ORAL
Qty: 0 | Refills: 0 | DISCHARGE
Start: 2019-07-16

## 2019-07-16 RX ORDER — APIXABAN 2.5 MG/1
2 TABLET, FILM COATED ORAL
Qty: 0 | Refills: 0 | DISCHARGE
Start: 2019-07-16

## 2019-07-16 RX ORDER — SENNA PLUS 8.6 MG/1
2 TABLET ORAL AT BEDTIME
Refills: 0 | Status: DISCONTINUED | OUTPATIENT
Start: 2019-07-16 | End: 2019-07-17

## 2019-07-16 RX ORDER — PANTOPRAZOLE SODIUM 20 MG/1
40 TABLET, DELAYED RELEASE ORAL
Refills: 0 | Status: DISCONTINUED | OUTPATIENT
Start: 2019-07-16 | End: 2019-07-17

## 2019-07-16 RX ORDER — PRIMIDONE 250 MG/1
1 TABLET ORAL
Qty: 0 | Refills: 0 | DISCHARGE
Start: 2019-07-16

## 2019-07-16 RX ADMIN — LOSARTAN POTASSIUM 100 MILLIGRAM(S): 100 TABLET, FILM COATED ORAL at 05:21

## 2019-07-16 RX ADMIN — PRIMIDONE 50 MILLIGRAM(S): 250 TABLET ORAL at 17:23

## 2019-07-16 RX ADMIN — Medication 100 MILLIGRAM(S): at 17:23

## 2019-07-16 RX ADMIN — SENNA PLUS 2 TABLET(S): 8.6 TABLET ORAL at 21:19

## 2019-07-16 RX ADMIN — ATORVASTATIN CALCIUM 10 MILLIGRAM(S): 80 TABLET, FILM COATED ORAL at 21:19

## 2019-07-16 RX ADMIN — PRIMIDONE 50 MILLIGRAM(S): 250 TABLET ORAL at 05:21

## 2019-07-16 RX ADMIN — Medication 650 MILLIGRAM(S): at 20:23

## 2019-07-16 RX ADMIN — TAMSULOSIN HYDROCHLORIDE 0.4 MILLIGRAM(S): 0.4 CAPSULE ORAL at 21:19

## 2019-07-16 RX ADMIN — APIXABAN 10 MILLIGRAM(S): 2.5 TABLET, FILM COATED ORAL at 05:21

## 2019-07-16 RX ADMIN — PANTOPRAZOLE SODIUM 40 MILLIGRAM(S): 20 TABLET, DELAYED RELEASE ORAL at 05:21

## 2019-07-16 RX ADMIN — LACTULOSE 10 GRAM(S): 10 SOLUTION ORAL at 17:21

## 2019-07-16 RX ADMIN — POLYETHYLENE GLYCOL 3350 17 GRAM(S): 17 POWDER, FOR SOLUTION ORAL at 12:39

## 2019-07-16 RX ADMIN — APIXABAN 10 MILLIGRAM(S): 2.5 TABLET, FILM COATED ORAL at 17:22

## 2019-07-16 RX ADMIN — Medication 650 MILLIGRAM(S): at 19:53

## 2019-07-16 RX ADMIN — ESCITALOPRAM OXALATE 10 MILLIGRAM(S): 10 TABLET, FILM COATED ORAL at 12:37

## 2019-07-16 NOTE — PROGRESS NOTE ADULT - SUBJECTIVE AND OBJECTIVE BOX
Patient is a 85y old  Male who presents with a chief complaint of GIB (16 Jul 2019 19:37)    	  MEDICATIONS:  losartan 100 milliGRAM(s) Oral daily  tamsulosin 0.4 milliGRAM(s) Oral at bedtime        PHYSICAL EXAM:  T(C): 36.8 (07-16-19 @ 19:42), Max: 37.1 (07-16-19 @ 00:22)  HR: 89 (07-16-19 @ 19:42) (78 - 89)  BP: 135/84 (07-16-19 @ 19:42) (120/70 - 135/84)  RR: 18 (07-16-19 @ 19:42) (18 - 18)  SpO2: 91% (07-16-19 @ 19:42) (91% - 97%)  Wt(kg): --  I&O's Summary    15 Jul 2019 07:01  -  16 Jul 2019 07:00  --------------------------------------------------------  IN: 995 mL / OUT: 1250 mL / NET: -255 mL    16 Jul 2019 07:01  -  16 Jul 2019 22:34  --------------------------------------------------------  IN: 600 mL / OUT: 100 mL / NET: 500 mL          Appearance: In no distress	  HEENT:    PERRL, EOMI	  Cardiovascular:  S1 S2, No JVD  Respiratory: Lungs clear to auscultation	  Gastrointestinal:  Soft, Non-tender, + BS	  Extremities:  No edema of LE                                13.1   6.05  )-----------( 241      ( 16 Jul 2019 09:56 )             39.8     07-16    133<L>  |  95<L>  |  8   ----------------------------<  91  4.0   |  26  |  0.60    Ca    8.3<L>      16 Jul 2019 07:03          Labs personally reviewed      Assessment and Plan:   · Assessment		  Pt is a 86 Y/O Male with hx of HTN, HLD presenting with intermittent black, tarry stools x 1 week with associated lower abdominal pain and nausea. No vomiting, fever, chills. No dysuria, frequency. No blood thinners. No chest pain, shortness of breath. Endorses intermittent lightheadedness. Has chronic back pain. in the ER he was found to have DVT of LE with stable Hg level.      Problem/Plan - 1:  ·  Problem: DVT (deep venous thrombosis).  Plan: seen on CT   Cont Eliquis  Likely 2/2 immobility, Heme consult appreciated      Problem/Plan - 2:  ·  Problem: Hypertension.  Plan: c/w Losartan 100mg       Problem/Plan - 3:  ·  Problem: High cholesterol.  Plan: c/w statin.               Robert Robertson DO [facility]  [facility]  [phone]

## 2019-07-16 NOTE — PHYSICAL THERAPY INITIAL EVALUATION ADULT - PRECAUTIONS/LIMITATIONS, REHAB EVAL
fall precautions/USThyroid/Parathyroid: Complex cystic nodules, measuring up to 2.9 cm in the lower pole of the R lobe of the thyroid gland and 2 cm in the mid left thyroid gland. The complex cystic lesion in the right lobe corresponds to the lesion noted on CTAdditional complex nodules are noted in the left lobe, the largest of which is in the upper pole measuring 1.8 x 1.1 x 1.4 cm and is indeterminate.CXR; Mild pulmonary edema CTangioabdomen/pelvis: No definite evidence of active gastrointestinal bleeding. However, evaluation of the colon is somewhat limited as described above.Celiac artery, SMA and JENNIE are patent. Deep venous thrombosis in the left external iliac vein.

## 2019-07-16 NOTE — CONSULT NOTE ADULT - SUBJECTIVE AND OBJECTIVE BOX
HPI:  Pt is a 86 Y/O Male with hx of HTN, HLD presenting with intermittent black, tarry stools x 1 week with associated lower abdominal pain and nausea. No vomiting, fever, chills. No dysuria, frequency. No blood thinners. No chest pain, shortness of breath. Endorses intermittent lightheadedness. Has chronic back pain. in the ER he was found to have DVT of LE with stable Hg level. (13 Jul 2019 19:02)      Admit Diagnosis  Gastrointestinal hemorrhage      ENDOCRINE HPI: 86 Y/O Male with hx of HTN, HLD admitted with black tarry stools noted with DVT. Bilateral thyroid nodules per CT and thyroid us.    Patient with no prior history of thyroid disease. TSH 1.64. Weight stable, no palpitations.  Patient noted with incidental right thyroid nodule on CT of chest. F/U thyroid US noted mixed solid and cystic thyroid nodules bilaterally right lower pole 2.9 cm and left side largest nodule 2.0 Ccm. No cervical adenopathy.      PAST MEDICAL & SURGICAL HISTORY:  BPH (benign prostatic hyperplasia)  Depression  High cholesterol  Hypertension  H/O Spinal surgery      FAMILY HISTORY:      Social History:    Outpatient Medications:    MEDICATIONS  (STANDING):  apixaban 10 milliGRAM(s) Oral every 12 hours  atorvastatin 10 milliGRAM(s) Oral at bedtime  docusate sodium 100 milliGRAM(s) Oral two times a day  escitalopram 10 milliGRAM(s) Oral daily  lactulose Syrup 10 Gram(s) Oral two times a day  losartan 100 milliGRAM(s) Oral daily  pantoprazole    Tablet 40 milliGRAM(s) Oral before breakfast  polyethylene glycol 3350 17 Gram(s) Oral daily  primidone 50 milliGRAM(s) Oral two times a day  senna 2 Tablet(s) Oral at bedtime  tamsulosin 0.4 milliGRAM(s) Oral at bedtime    MEDICATIONS  (PRN):  acetaminophen   Tablet .. 650 milliGRAM(s) Oral every 6 hours PRN Mild Pain (1 - 3), Moderate Pain (4 - 6)  morphine  IR 15 milliGRAM(s) Oral every 6 hours PRN Severe Pain (7 - 10)  ondansetron Injectable 4 milliGRAM(s) IV Push every 8 hours PRN Nausea and/or Vomiting  simethicone 80 milliGRAM(s) Chew every 4 hours PRN Gas      Allergies    No Known Allergies    Intolerances        Review of Systems:  As above, admit noted non-english speaking.    PHYSICAL EXAM:  VITALS: T(C): 36.4 (07-16-19 @ 12:02)  T(F): 97.5 (07-16-19 @ 12:02), Max: 98.7 (07-16-19 @ 00:22)  HR: 82 (07-16-19 @ 12:17) (78 - 84)  BP: 124/79 (07-16-19 @ 12:17) (120/70 - 135/76)  RR:  (18 - 18)  SpO2:  (93% - 97%)  GENERAL: NAD, well-groomed, well-developed  EYES: No proptosis, no lid lag, anicteric  HEENT:  Atraumatic, Normocephalic, moist mucous membranes  THYROID: right lobe lower smoth mobile 2 cm nodule, left lobe eft lobe 1.5 cm similar nodule, no cervical adenopathy.  RESPIRATORY: Clear to auscultation bilaterally; No rales, rhonchi, wheezing  CARDIOVASCULAR: Regular rate and rhythm; No murmurs; no peripheral edema  GI: Soft, nontender, non distended, normal bowel sounds  SKIN: Dry, intact, No rashes or lesions  MUSCULOSKELETAL: Full range of motion, normal strength  NEURO: sensation intact, extraocular movements intact, no tremor  PSYCH: Alert and responsive.                               13.1   6.05  )-----------( 241      ( 16 Jul 2019 09:56 )             39.8       07-16    133<L>  |  95<L>  |  8   ----------------------------<  91  4.0   |  26  |  0.60    Ca    8.3<L>      16 Jul 2019 07:03        Thyroid Function Tests:  07-14 @ 10:05 TSH 1.64 FreeT4 -- T3 -- Anti TPO -- Anti Thyroglobulin Ab -- TSI --    < from: US Thyroid + Parathyroid (07.15.19 @ 16:29) >  EXAM:  US THYROID PARATHYROID                            PROCEDURE DATE:  07/15/2019            INTERPRETATION:  CLINICAL INFORMATION: Evaluate thyroid nodule noted on   chest CT.    COMPARISON: Correlation is made with chest CT dated 7/15/2019.    TECHNIQUE: Sonography of the thyroid.     FINDINGS:    Right Lobe: 5.4 x 2.2 x 2.3 cm. A 2.9 x 2.4 x 2 cm complex cystic nodule   is noted in the lower pole, corresponding to the nodule noted on CT.   Coarse calcifications are noted within the right lobe.    Left Lobe: 5.9 x 1.8 x 2.5 cm. Multiple complex nodules. The largest is a   complex cystic nodule in the midpole measuring 1.7 x 1.4 x 2 cm. A   complex nodule in the upper pole measures 1.8 x 1.1 x 1.4 cm and contains   coarse calcifications. Additional complex nodules in the midpole measure   0.9 and 0.8 cm respectively.    Isthmus: 3 mm.     Cervical Lymph Nodes: No enlarged or abnormal morphology cervical nodes.    IMPRESSION:     Complex cystic nodules, measuring up to 2.9 cm in the lower pole of the   right lobe of the thyroid gland and 2 cm in the mid left thyroid gland.   The complex cystic lesion in the right lobe corresponds to the lesion   noted on CT.    Additional complex nodules are noted in the left lobe, the largest of  which is in the upper pole measuring   1.8 x 1.1 x 1.4 cm and is indeterminate.        < end of copied text >    Hemoglobin A1C, Whole Blood: 5.8 % <H> [4.0 - 5.6] (07-14-19 @ 09:53)      07-14 Chol 120 LDL 63 HDL 49 Trig 41    Radiology:

## 2019-07-16 NOTE — PROGRESS NOTE ADULT - SUBJECTIVE AND OBJECTIVE BOX
LESLI YANG:[id number],   85yMale followed for:  No Known Allergies    PAST MEDICAL & SURGICAL HISTORY:  BPH (benign prostatic hyperplasia)  Depression  High cholesterol  Hypertension  H/O Spinal surgery    FAMILY HISTORY:    MEDICATIONS  (STANDING):  apixaban 10 milliGRAM(s) Oral every 12 hours  atorvastatin 10 milliGRAM(s) Oral at bedtime  docusate sodium 100 milliGRAM(s) Oral two times a day  escitalopram 10 milliGRAM(s) Oral daily  lactulose Syrup 10 Gram(s) Oral two times a day  losartan 100 milliGRAM(s) Oral daily  pantoprazole    Tablet 40 milliGRAM(s) Oral before breakfast  polyethylene glycol 3350 17 Gram(s) Oral daily  primidone 50 milliGRAM(s) Oral two times a day  senna 2 Tablet(s) Oral at bedtime  tamsulosin 0.4 milliGRAM(s) Oral at bedtime    MEDICATIONS  (PRN):  acetaminophen   Tablet .. 650 milliGRAM(s) Oral every 6 hours PRN Mild Pain (1 - 3), Moderate Pain (4 - 6)  morphine  IR 15 milliGRAM(s) Oral every 6 hours PRN Severe Pain (7 - 10)  ondansetron Injectable 4 milliGRAM(s) IV Push every 8 hours PRN Nausea and/or Vomiting  simethicone 80 milliGRAM(s) Chew every 4 hours PRN Gas      Vital Signs Last 24 Hrs  T(C): 36.4 (16 Jul 2019 12:02), Max: 37.1 (16 Jul 2019 00:22)  T(F): 97.5 (16 Jul 2019 12:02), Max: 98.7 (16 Jul 2019 00:22)  HR: 82 (16 Jul 2019 12:17) (78 - 84)  BP: 124/79 (16 Jul 2019 12:17) (120/70 - 135/76)  BP(mean): --  RR: 18 (16 Jul 2019 12:02) (18 - 18)  SpO2: 94% (16 Jul 2019 12:17) (93% - 97%)  nc/at  s1s2  cta  soft, nt, nd no guarding or rebound  no c/c/e    CBC Full  -  ( 16 Jul 2019 09:56 )  WBC Count : 6.05 K/uL  RBC Count : 4.16 M/uL  Hemoglobin : 13.1 g/dL  Hematocrit : 39.8 %  Platelet Count - Automated : 241 K/uL  Mean Cell Volume : 95.7 fl  Mean Cell Hemoglobin : 31.5 pg  Mean Cell Hemoglobin Concentration : 32.9 gm/dL  Auto Neutrophil # : x  Auto Lymphocyte # : x  Auto Monocyte # : x  Auto Eosinophil # : x  Auto Basophil # : x  Auto Neutrophil % : x  Auto Lymphocyte % : x  Auto Monocyte % : x  Auto Eosinophil % : x  Auto Basophil % : x    07-16    133<L>  |  95<L>  |  8   ----------------------------<  91  4.0   |  26  |  0.60    Ca    8.3<L>      16 Jul 2019 07:03      PTT - ( 15 Jul 2019 10:17 )  PTT:71.4 sec

## 2019-07-16 NOTE — PHYSICAL THERAPY INITIAL EVALUATION ADULT - PERTINENT HX OF CURRENT PROBLEM, REHAB EVAL
86 Y/O M with hx of HTN, HLD p/w intermittent black, tarry stools x 1 wk a/w lower abdominal pain and nausea. No vomiting, fever, chills. No dysuria, frequency. Endorses intermittent lightheadedness. Has chronic back pain. in the ER he was found to have DVT of LE with stable Hg level. CTChest (-)

## 2019-07-16 NOTE — CONSULT NOTE ADULT - ASSESSMENT
Will complete this consult later today  case d/w Dr. Bashir 86 y/o man with LE dvt. Recent h/o spine surgery. Now on AC.  Period of imobility.  AC started.  Will check Factor V leidne and PT gene mutation.  Remainder of hypercoagulation work up can be skewed by DVT, would hold off for now.  CT c/a/p unremarkable.

## 2019-07-16 NOTE — PHYSICAL THERAPY INITIAL EVALUATION ADULT - GENERAL OBSERVATIONS, REHAB EVAL
pt received semi-supine in bed in NAD +IV lock, Maltese  phone used 1st half of session, 2nd half of session kalyan harris

## 2019-07-16 NOTE — CONSULT NOTE ADULT - ASSESSMENT
86 Y/O Male with hx of HTN, HLD admitted with black tarry stools noted with DVT. Bilateral thyroid nodules per CT and thyroid us.    Patient with no prior history of thyroid disease. TSH 1.64. Weight stable, no palpitations.  Patient noted with incidental right thyroid nodule on CT of chest. F/U thyroid US noted mixed solid and cystic thyroid nodules bilaterally right lower pole 2.9 cm and left side largest nodule 2.0 Ccm. No cervical adenopathy.    Incidental mixed cystic thyroid nodules noted, with no h/o thyroid disease and normal thyroid function.  The nodules do not appear suspicious on exam and per ultrasound, no cervical adenopathy.  As the patient is on anticoagulation for recent DVT, interruption of treatment for FNA has higher risk than the said nodules.    Suggest:  No need for further testing at this time.  Repeat ultrasound in six months time.

## 2019-07-16 NOTE — CONSULT NOTE ADULT - SUBJECTIVE AND OBJECTIVE BOX
LESLI YANG  MRN-[id number]    Patient is a 85y old  Male who presents with a chief complaint of GIB (15 Jul 2019 15:22)      HPI  HPI:  Pt is a 84 Y/O Male with hx of HTN, HLD presenting with intermittent black, tarry stools x 1 week with associated lower abdominal pain and nausea. No vomiting, fever, chills. No dysuria, frequency. No blood thinners. No chest pain, shortness of breath. Endorses intermittent lightheadedness. Has chronic back pain. in the ER he was found to have DVT of LE with stable Hg level. (13 Jul 2019 19:02)      Past Medical History  PAST MEDICAL & SURGICAL HISTORY:  BPH (benign prostatic hyperplasia)  Depression  High cholesterol  Hypertension  H/O Spinal surgery      Current Meds  MEDICATIONS  (STANDING):  apixaban 10 milliGRAM(s) Oral every 12 hours  atorvastatin 10 milliGRAM(s) Oral at bedtime  escitalopram 10 milliGRAM(s) Oral daily  losartan 100 milliGRAM(s) Oral daily  pantoprazole  Injectable 40 milliGRAM(s) IV Push two times a day  primidone 50 milliGRAM(s) Oral two times a day  tamsulosin 0.4 milliGRAM(s) Oral at bedtime    MEDICATIONS  (PRN):  acetaminophen   Tablet .. 650 milliGRAM(s) Oral every 6 hours PRN Mild Pain (1 - 3), Moderate Pain (4 - 6)  morphine  IR 15 milliGRAM(s) Oral every 6 hours PRN Severe Pain (7 - 10)  ondansetron Injectable 4 milliGRAM(s) IV Push every 8 hours PRN Nausea and/or Vomiting      Allergies  Allergies    No Known Allergies    Intolerances        Social History  , Retired. No tob.  No etoh    Family History  FAMILY HISTORY:      Review of System  REVIEW OF SYSTEMS      General:	Denies fatigue, fevers, chills, sweats, decreased appetite.    Skin/Breast: denies pruritis, rash  	  Ophthalmologic: no change in vision or blurring  	  HEENT	Denies dry mouth, oral sores, dysphagia,  change in hearing.    Respiratory and Thorax:  cough, sob, wheeze, hemoptysis  	  Cardiovascular:	no cp , palp, orthopnea    Gastrointestinal:	no n/v/d constipation    Genitourinary:	no dysuria of frequency, no hematuria, no flank pain    Musculoskeletal:	no bone or joint pain. no muscle aches.     Neurological:	no change in sensory or motor function. no headache. no weakness.     Psychiatric:	no depression, no anxiety, insomnia.     Hematology/Lymphatics:	no bleeding or bruising        Vitals  Vital Signs Last 24 Hrs  T(C): 36.7 (16 Jul 2019 05:14), Max: 37.1 (16 Jul 2019 00:22)  T(F): 98.1 (16 Jul 2019 05:14), Max: 98.7 (16 Jul 2019 00:22)  HR: 81 (16 Jul 2019 05:14) (77 - 85)  BP: 123/67 (16 Jul 2019 05:14) (118/67 - 135/76)  BP(mean): --  RR: 18 (16 Jul 2019 05:14) (18 - 18)  SpO2: 96% (16 Jul 2019 05:14) (95% - 97%)    Physical Exam  PHYSICAL EXAM:      Constitutional: NAD    Eyes: PERRLA EOMI, anicteric sclera    Heent :No oral sores, no pharyngeal injection. moist mucosa.    Neck: supple, no jvd, no LAD    Respiratory: CTA b/l     Cardiovascular: s1s2, no m/g/r    Gastrointestinal: soft, nt, nd, + BS    Extremities: no c/c/e    Neurological:A&O x 3 moves all ext.    Skin: no rash on exposed skin    Lymph Nodes: no lymphadenopathy.              Lab  CBC Full  -  ( 15 Jul 2019 08:32 )  WBC Count : 4.55 K/uL  RBC Count : 4.09 M/uL  Hemoglobin : 12.8 g/dL  Hematocrit : 38.9 %  Platelet Count - Automated : 239 K/uL  Mean Cell Volume : 95.1 fl  Mean Cell Hemoglobin : 31.3 pg  Mean Cell Hemoglobin Concentration : 32.9 gm/dL  Auto Neutrophil # : x  Auto Lymphocyte # : x  Auto Monocyte # : x  Auto Eosinophil # : x  Auto Basophil # : x  Auto Neutrophil % : x  Auto Lymphocyte % : x  Auto Monocyte % : x  Auto Eosinophil % : x  Auto Basophil % : x    07-16    133<L>  |  95<L>  |  8   ----------------------------<  91  4.0   |  26  |  0.60    Ca    8.3<L>      16 Jul 2019 07:03    TPro  5.2<L>  /  Alb  3.1<L>  /  TBili  0.5  /  DBili  x   /  AST  18  /  ALT  10  /  AlkPhos  65  07-14    PTT - ( 15 Jul 2019 10:17 )  PTT:71.4 sec    Rad:    Assessment/Plan LESLI YANG  MRN-[id number]    Patient is a 85y old  Male who presents with a chief complaint of GIB (15 Jul 2019 15:22)      HPI  Pt is a 84 Y/O Male with hx of HTN, HLD presenting with intermittent black, tarry stools x 1 week with associated lower abdominal pain and nausea. No vomiting, fever, chills. No dysuria, frequency. No blood thinners. No chest pain, shortness of breath. Endorses intermittent lightheadedness. Has chronic back pain. in the ER he was found to have DVT of LE with stable Hg level.   Patient with h/o spinal surgery, decreased mobility.  GI following, no evidence of bleed.  Patient on AC    Past Medical History  PAST MEDICAL & SURGICAL HISTORY:  BPH (benign prostatic hyperplasia)  Depression  High cholesterol  Hypertension  H/O Spinal surgery      MEDICATIONS  (STANDING):  apixaban 10 milliGRAM(s) Oral every 12 hours  atorvastatin 10 milliGRAM(s) Oral at bedtime  escitalopram 10 milliGRAM(s) Oral daily  losartan 100 milliGRAM(s) Oral daily  pantoprazole  Injectable 40 milliGRAM(s) IV Push two times a day  primidone 50 milliGRAM(s) Oral two times a day  tamsulosin 0.4 milliGRAM(s) Oral at bedtime    MEDICATIONS  (PRN):  acetaminophen   Tablet .. 650 milliGRAM(s) Oral every 6 hours PRN Mild Pain (1 - 3), Moderate Pain (4 - 6)  morphine  IR 15 milliGRAM(s) Oral every 6 hours PRN Severe Pain (7 - 10)  ondansetron Injectable 4 milliGRAM(s) IV Push every 8 hours PRN Nausea and/or Vomiting      Allergies    No Known Allergies    Social History  No tob.  No etoh    Family History  non-contrib      REVIEW OF SYSTEMS      General:	Denies fatigue, fevers, chills, sweats, decreased appetite.    Skin/Breast: denies pruritis, rash  	  Ophthalmologic: no change in vision or blurring  	  HEENT	Denies dry mouth, oral sores, dysphagia,  change in hearing.    Respiratory and Thorax:  cough, sob, wheeze, hemoptysis  	  Cardiovascular:	no cp , palp, orthopnea    Gastrointestinal:	as above    Genitourinary:	no dysuria of frequency, no hematuria, no flank pain    Musculoskeletal:	no bone or joint pain. no muscle aches.     Neurological:	no change in sensory or motor function. no headache. no weakness.     Psychiatric:	no depression, no anxiety, insomnia.     Hematology/Lymphatics:	no bleeding or bruising    Vitals  Vital Signs Last 24 Hrs  T(C): 36.7 (16 Jul 2019 05:14), Max: 37.1 (16 Jul 2019 00:22)  T(F): 98.1 (16 Jul 2019 05:14), Max: 98.7 (16 Jul 2019 00:22)  HR: 81 (16 Jul 2019 05:14) (77 - 85)  BP: 123/67 (16 Jul 2019 05:14) (118/67 - 135/76)  BP(mean): --  RR: 18 (16 Jul 2019 05:14) (18 - 18)  SpO2: 96% (16 Jul 2019 05:14) (95% - 97%)      PHYSICAL EXAM:    Constitutional: NAD    Eyes: PERRLA EOMI, anicteric sclera    Heent :No oral sores, no pharyngeal injection. moist mucosa.    Neck: supple, no jvd, no LAD    Respiratory: CTA b/l     Cardiovascular: s1s2, no m/g/r    Gastrointestinal: soft, nt, nd, + BS    Extremities: no c/c/e    Neurological:A&O x 3 moves all ext.    Skin: no rash on exposed skin    Lymph Nodes: no lymphadenopathy.      Lab  CBC Full  -  ( 15 Jul 2019 08:32 )  WBC Count : 4.55 K/uL  RBC Count : 4.09 M/uL  Hemoglobin : 12.8 g/dL  Hematocrit : 38.9 %  Platelet Count - Automated : 239 K/uL  Mean Cell Volume : 95.1 fl  Mean Cell Hemoglobin : 31.3 pg  Mean Cell Hemoglobin Concentration : 32.9 gm/dL  Auto Neutrophil # : x  Auto Lymphocyte # : x  Auto Monocyte # : x  Auto Eosinophil # : x  Auto Basophil # : x  Auto Neutrophil % : x  Auto Lymphocyte % : x  Auto Monocyte % : x  Auto Eosinophil % : x  Auto Basophil % : x    07-16    133<L>  |  95<L>  |  8   ----------------------------<  91  4.0   |  26  |  0.60    Ca    8.3<L>      16 Jul 2019 07:03    TPro  5.2<L>  /  Alb  3.1<L>  /  TBili  0.5  /  DBili  x   /  AST  18  /  ALT  10  /  AlkPhos  65  07-14    PTT - ( 15 Jul 2019 10:17 )  PTT:71.4 sec    Rad:    Assessment/Plan

## 2019-07-16 NOTE — PROGRESS NOTE ADULT - SUBJECTIVE AND OBJECTIVE BOX
Subjective: Patient seen and examined. No new events except as noted.   doing well  no bowel movement     REVIEW OF SYSTEMS:    CONSTITUTIONAL: No weakness, fevers or chills  EYES/ENT: No visual changes;  No vertigo or throat pain   NECK: No pain or stiffness  RESPIRATORY: No cough, wheezing, hemoptysis; No shortness of breath  CARDIOVASCULAR: No chest pain or palpitations  GASTROINTESTINAL: No abdominal or epigastric pain. No nausea, vomiting, or hematemesis; No diarrhea or constipation. No melena or hematochezia.  GENITOURINARY: No dysuria, frequency or hematuria  NEUROLOGICAL: No numbness or weakness  SKIN: No itching, burning, rashes, or lesions   All other review of systems is negative unless indicated above.    MEDICATIONS:  MEDICATIONS  (STANDING):  apixaban 10 milliGRAM(s) Oral every 12 hours  atorvastatin 10 milliGRAM(s) Oral at bedtime  docusate sodium 100 milliGRAM(s) Oral two times a day  escitalopram 10 milliGRAM(s) Oral daily  lactulose Syrup 10 Gram(s) Oral two times a day  losartan 100 milliGRAM(s) Oral daily  pantoprazole  Injectable 40 milliGRAM(s) IV Push two times a day  polyethylene glycol 3350 17 Gram(s) Oral daily  primidone 50 milliGRAM(s) Oral two times a day  senna 2 Tablet(s) Oral at bedtime  tamsulosin 0.4 milliGRAM(s) Oral at bedtime      PHYSICAL EXAM:  T(C): 36.4 (07-16-19 @ 12:02), Max: 37.1 (07-16-19 @ 00:22)  HR: 82 (07-16-19 @ 12:17) (77 - 84)  BP: 124/79 (07-16-19 @ 12:17) (120/70 - 135/76)  RR: 18 (07-16-19 @ 12:02) (18 - 18)  SpO2: 94% (07-16-19 @ 12:17) (93% - 97%)  Wt(kg): --  I&O's Summary    15 Jul 2019 07:01  -  16 Jul 2019 07:00  --------------------------------------------------------  IN: 995 mL / OUT: 1250 mL / NET: -255 mL    16 Jul 2019 07:01  -  16 Jul 2019 15:16  --------------------------------------------------------  IN: 480 mL / OUT: 0 mL / NET: 480 mL          Appearance: Normal	  HEENT:   Normal oral mucosa, PERRL, EOMI	  Lymphatic: No lymphadenopathy , no edema  Cardiovascular: Normal S1 S2, No JVD, No murmurs , Peripheral pulses palpable 2+ bilaterally  Respiratory: Lungs clear to auscultation, normal effort 	  Gastrointestinal:  Soft, Non-tender, + BS	  Skin: No rashes, No ecchymoses, No cyanosis, warm to touch  Musculoskeletal: Normal range of motion, normal strength  Psychiatry:  Mood & affect appropriate  Ext: No edema      All labs, Imaging and EKGs personally reviewed                           13.1   6.05  )-----------( 241      ( 16 Jul 2019 09:56 )             39.8               07-16    133<L>  |  95<L>  |  8   ----------------------------<  91  4.0   |  26  |  0.60    Ca    8.3<L>      16 Jul 2019 07:03      PTT - ( 15 Jul 2019 10:17 )  PTT:71.4 sec

## 2019-07-16 NOTE — DISCHARGE NOTE PROVIDER - CARE PROVIDER_API CALL
Javier Avilez (DO)  Gastroenterology; Internal Medicine  [address] [facility] [address]  [place] [address]  Phone: [phone]  Fax: [phone]  Follow Up Time:     Giovanny Hobbs)  Internal Medicine  [address] [place] [address] [address]  [place] [address]  Phone: [phone]  Fax: [phone]  Follow Up Time: Javier Avilez (DO)  Gastroenterology; Internal Medicine  [address] [facility] [address]  [place] [address]  Phone: [phone]  Fax: [phone]  Follow Up Time:     Giovanny Hobbs (MD)  Internal Medicine  [address] [facility] [address]  [place] [address]  Phone: [phone]  Fax: [phone]  Follow Up Time:     Rober Lopez)  Hematology; Medical Oncology  [address] [facility], [address]  [place] [address]  Phone: [phone]  Fax: [phone]  Follow Up Time:

## 2019-07-16 NOTE — DISCHARGE NOTE PROVIDER - CARE PROVIDERS DIRECT ADDRESSES
[email or web address],[email or web address],DirectAddress_Unknown

## 2019-07-16 NOTE — DISCHARGE NOTE PROVIDER - NSDCCPCAREPLAN_GEN_ALL_CORE_FT
PRINCIPAL DISCHARGE DIAGNOSIS  Diagnosis: GIB (gastrointestinal bleeding)  Assessment and Plan of Treatment: seen by GI; hemoglobin stable; no endoscopy at this time per GI.  stool occult negative;  continue protonix      SECONDARY DISCHARGE DIAGNOSES  Diagnosis: Thyroid nodule  Assessment and Plan of Treatment: Thyroid nodule  follow up as outpatient with endocrinologist    Diagnosis: DVT (deep venous thrombosis)  Assessment and Plan of Treatment: DVT (deep venous thrombosis)  continue eliquis PRINCIPAL DISCHARGE DIAGNOSIS  Diagnosis: GIB (gastrointestinal bleeding)  Assessment and Plan of Treatment: seen by GI; hemoglobin stable; no endoscopy at this time per GI.  stool occult negative;  continue protonix      SECONDARY DISCHARGE DIAGNOSES  Diagnosis: Thyroid nodule  Assessment and Plan of Treatment: Thyroid nodule  follow up as outpatient with endocrinologist    Diagnosis: DVT (deep venous thrombosis)  Assessment and Plan of Treatment: DVT (deep venous thrombosis)  continue eliquis  Eliquis 10mg po twice daily for 5 more days, then 5mg po twice daily for 3 months and then 2.5mg po twice daily- PRINCIPAL DISCHARGE DIAGNOSIS  Diagnosis: GIB (gastrointestinal bleeding)  Assessment and Plan of Treatment: seen by GI; hemoglobin stable; no endoscopy at this time per GI.  stool occult negative;  continue protonix      SECONDARY DISCHARGE DIAGNOSES  Diagnosis: Thyroid nodule  Assessment and Plan of Treatment: Thyroid nodule  follow up as outpatient with endocrinologist    Diagnosis: BPH (benign prostatic hyperplasia)  Assessment and Plan of Treatment: BPH (benign prostatic hyperplasia)  continue home medication    Diagnosis: DVT (deep venous thrombosis)  Assessment and Plan of Treatment: DVT (deep venous thrombosis)  continue eliquis  Eliquis 10mg po twice daily for 5 more days, then 5mg po twice daily for 3 months and then 2.5mg po twice daily-

## 2019-07-16 NOTE — PHYSICAL THERAPY INITIAL EVALUATION ADULT - ADDITIONAL COMMENTS
Pt& wife live in an apartment w/ elevator access. Partial assess w/ ADLs & personal care. DME: cane. Pt has a medicaid aide 8hrs 2 days/week

## 2019-07-16 NOTE — PHYSICAL THERAPY INITIAL EVALUATION ADULT - ORIENTATION, REHAB EVAL
Continue all current medications as noted.  You can take the mirtazapine 7.5 mg tablet 1/2 - 1 tablet at bedtime (I recommend you take at least 1/2 tablet nightly).  Also, continue the Lexapro 20 mg daily.   You can use the Xanax, if needed.    Return to clinic in 6 months for follow up.      oriented to person, place, time and situation

## 2019-07-16 NOTE — DISCHARGE NOTE PROVIDER - PROVIDER TOKENS
PROVIDER:[TOKEN:[[id number]]],PROVIDER:[TOKEN:[[id number]]] PROVIDER:[TOKEN:[[id number]]],PROVIDER:[TOKEN:[[id number]]],PROVIDER:[TOKEN:[[id number]]]

## 2019-07-16 NOTE — DISCHARGE NOTE PROVIDER - HOSPITAL COURSE
Pt is a 84 Y/O Male with hx of HTN, HLD presenting with intermittent black, tarry stools x 1 week with associated lower abdominal pain and nausea. N    GIB (gastrointestinal bleeding).   dark black stool as per family Hg stable    protonix per GI; Stool occult negative ; seen by GI     no indication  for EGD colonoscopy.      DVT (deep venous thrombosis).  seen on CT     started on heparin drip and changed  to eliquis     · Thyroid nodule. seen on CT    Thyroid US    outpatient F/U after discharge with Endo for further management. Pt is a 84 Y/O Male with hx of HTN, HLD presenting with intermittent black, tarry stools x 1 week with associated lower abdominal pain and nausea. N    GIB (gastrointestinal bleeding).   dark black stool as per family Hg stable    protonix per GI; Stool occult negative ; seen by GI     no indication  for EGD colonoscopy.      DVT (deep venous thrombosis).  seen on CT     started on heparin drip and changed  to eliquis ; MD to follow up on dosing at rehab and as outpatient.    · Thyroid nodule. seen on CT    Thyroid US-Complex cystic nodules, measuring up to 2.9 cm in the lower pole of the     right lobe of the thyroid gland and 2 cm in the mid left thyroid gland.     The complex cystic lesion in the right lobe corresponds to the lesion     noted on CT.        Additional complex nodules are noted in the left lobe, the largest of     which is in the upper pole measuring     1.8 x 1.1 x 1.4 cm and is indeterminate.        outpatient F/U after discharge with Endo for further management.     patient with chronic compression fracture of T12; physical therapy; pain medication follow up;    patient is cleared by MD for discharge to rehab. Pt is a 84 Y/O Male with hx of HTN, HLD presenting with intermittent black, tarry stools x 1 week with associated lower abdominal pain and nausea. chronic back pain.     GIB (gastrointestinal bleeding).   dark black stool as per family Hg stable    protonix per GI; Stool occult negative ; seen by GI     no indication  for EGD colonoscopy.      DVT (deep venous thrombosis).  seen on CT     started on heparin drip and changed  to eliquis ; MD to follow up on dosing at rehab and as outpatient.    · Thyroid nodule. seen on CT    Thyroid US-Complex cystic nodules, measuring up to 2.9 cm in the lower pole of the     right lobe of the thyroid gland and 2 cm in the mid left thyroid gland.     The complex cystic lesion in the right lobe corresponds to the lesion     noted on CT.        Additional complex nodules are noted in the left lobe, the largest of     which is in the upper pole measuring     1.8 x 1.1 x 1.4 cm and is indeterminate.        outpatient F/U after discharge with Endo for further management.     patient with chronic compression fracture of T12; physical therapy; pain medication follow up;    patient is cleared by MD for discharge to rehab. Pt is a 84 Y/O Male with hx of HTN, HLD presenting with intermittent black, tarry stools x 1 week with associated lower abdominal pain and nausea. chronic back pain.     GIB (gastrointestinal bleeding).   dark black stool as per family Hg stable    protonix per GI; Stool occult negative ; seen by GI     no indication  for EGD colonoscopy.      DVT (deep venous thrombosis).  seen on CT     started on heparin drip and changed  to eliquis ; MD to follow up on dosing at rehab and as outpatient.    · Thyroid nodule. seen on CT    Thyroid US-Complex cystic nodules, measuring up to 2.9 cm in the lower pole of the     right lobe of the thyroid gland and 2 cm in the mid left thyroid gland.     The complex cystic lesion in the right lobe corresponds to the lesion     noted on CT.        Additional complex nodules are noted in the left lobe, the largest of     which is in the upper pole measuring     1.8 x 1.1 x 1.4 cm and is indeterminate.        outpatient F/U after discharge with Endo for further management.     seen by hematology for hypercoagulopathy work up- follow up as outpateint.    patient with chronic compression fracture of T12; physical therapy; pain medication follow up;    patient is cleared by MD for discharge to rehab.

## 2019-07-17 ENCOUNTER — TRANSCRIPTION ENCOUNTER (OUTPATIENT)
Age: 84
End: 2019-07-17

## 2019-07-17 VITALS — OXYGEN SATURATION: 94 %

## 2019-07-17 PROBLEM — I10 ESSENTIAL (PRIMARY) HYPERTENSION: Chronic | Status: ACTIVE | Noted: 2019-07-13

## 2019-07-17 PROBLEM — N40.0 BENIGN PROSTATIC HYPERPLASIA WITHOUT LOWER URINARY TRACT SYMPTOMS: Chronic | Status: ACTIVE | Noted: 2019-07-13

## 2019-07-17 PROBLEM — F32.9 MAJOR DEPRESSIVE DISORDER, SINGLE EPISODE, UNSPECIFIED: Chronic | Status: ACTIVE | Noted: 2019-07-13

## 2019-07-17 PROBLEM — E78.00 PURE HYPERCHOLESTEROLEMIA, UNSPECIFIED: Chronic | Status: ACTIVE | Noted: 2019-07-13

## 2019-07-17 PROCEDURE — 80061 LIPID PANEL: CPT

## 2019-07-17 PROCEDURE — 87086 URINE CULTURE/COLONY COUNT: CPT

## 2019-07-17 PROCEDURE — 76536 US EXAM OF HEAD AND NECK: CPT

## 2019-07-17 PROCEDURE — 85610 PROTHROMBIN TIME: CPT

## 2019-07-17 PROCEDURE — 82947 ASSAY GLUCOSE BLOOD QUANT: CPT

## 2019-07-17 PROCEDURE — 96374 THER/PROPH/DIAG INJ IV PUSH: CPT | Mod: XU

## 2019-07-17 PROCEDURE — G0452: CPT | Mod: 26

## 2019-07-17 PROCEDURE — 93306 TTE W/DOPPLER COMPLETE: CPT

## 2019-07-17 PROCEDURE — 82803 BLOOD GASES ANY COMBINATION: CPT

## 2019-07-17 PROCEDURE — 80048 BASIC METABOLIC PNL TOTAL CA: CPT

## 2019-07-17 PROCEDURE — 85730 THROMBOPLASTIN TIME PARTIAL: CPT

## 2019-07-17 PROCEDURE — 83605 ASSAY OF LACTIC ACID: CPT

## 2019-07-17 PROCEDURE — 97162 PT EVAL MOD COMPLEX 30 MIN: CPT

## 2019-07-17 PROCEDURE — 71250 CT THORAX DX C-: CPT

## 2019-07-17 PROCEDURE — 80053 COMPREHEN METABOLIC PANEL: CPT

## 2019-07-17 PROCEDURE — 84132 ASSAY OF SERUM POTASSIUM: CPT

## 2019-07-17 PROCEDURE — 86900 BLOOD TYPING SEROLOGIC ABO: CPT

## 2019-07-17 PROCEDURE — 82330 ASSAY OF CALCIUM: CPT

## 2019-07-17 PROCEDURE — 81001 URINALYSIS AUTO W/SCOPE: CPT

## 2019-07-17 PROCEDURE — 85014 HEMATOCRIT: CPT

## 2019-07-17 PROCEDURE — 99285 EMERGENCY DEPT VISIT HI MDM: CPT | Mod: 25

## 2019-07-17 PROCEDURE — 93005 ELECTROCARDIOGRAM TRACING: CPT

## 2019-07-17 PROCEDURE — 85027 COMPLETE CBC AUTOMATED: CPT

## 2019-07-17 PROCEDURE — 82272 OCCULT BLD FECES 1-3 TESTS: CPT

## 2019-07-17 PROCEDURE — 83036 HEMOGLOBIN GLYCOSYLATED A1C: CPT

## 2019-07-17 PROCEDURE — 96375 TX/PRO/DX INJ NEW DRUG ADDON: CPT

## 2019-07-17 PROCEDURE — 81241 F5 GENE: CPT

## 2019-07-17 PROCEDURE — 71045 X-RAY EXAM CHEST 1 VIEW: CPT

## 2019-07-17 PROCEDURE — 86850 RBC ANTIBODY SCREEN: CPT

## 2019-07-17 PROCEDURE — 74174 CTA ABD&PLVS W/CONTRAST: CPT

## 2019-07-17 PROCEDURE — 84443 ASSAY THYROID STIM HORMONE: CPT

## 2019-07-17 PROCEDURE — 81240 F2 GENE: CPT

## 2019-07-17 PROCEDURE — 93306 TTE W/DOPPLER COMPLETE: CPT | Mod: 26

## 2019-07-17 PROCEDURE — 86901 BLOOD TYPING SEROLOGIC RH(D): CPT

## 2019-07-17 PROCEDURE — 82435 ASSAY OF BLOOD CHLORIDE: CPT

## 2019-07-17 PROCEDURE — 84295 ASSAY OF SERUM SODIUM: CPT

## 2019-07-17 RX ORDER — PANTOPRAZOLE SODIUM 20 MG/1
1 TABLET, DELAYED RELEASE ORAL
Qty: 0 | Refills: 0 | DISCHARGE
Start: 2019-07-17

## 2019-07-17 RX ORDER — MORPHINE SULFATE 50 MG/1
1 CAPSULE, EXTENDED RELEASE ORAL
Qty: 0 | Refills: 0 | DISCHARGE
Start: 2019-07-17

## 2019-07-17 RX ORDER — ATORVASTATIN CALCIUM 80 MG/1
0 TABLET, FILM COATED ORAL
Qty: 0 | Refills: 0 | DISCHARGE

## 2019-07-17 RX ORDER — LOSARTAN POTASSIUM 100 MG/1
0 TABLET, FILM COATED ORAL
Qty: 0 | Refills: 0 | DISCHARGE

## 2019-07-17 RX ORDER — DUTASTERIDE 0.5 MG/1
0 CAPSULE, LIQUID FILLED ORAL
Qty: 0 | Refills: 0 | DISCHARGE

## 2019-07-17 RX ORDER — PRIMIDONE 250 MG/1
0 TABLET ORAL
Qty: 0 | Refills: 0 | DISCHARGE

## 2019-07-17 RX ORDER — ESCITALOPRAM OXALATE 10 MG/1
0 TABLET, FILM COATED ORAL
Qty: 0 | Refills: 0 | DISCHARGE

## 2019-07-17 RX ORDER — RANITIDINE HYDROCHLORIDE 150 MG/1
0 TABLET, FILM COATED ORAL
Qty: 0 | Refills: 0 | DISCHARGE

## 2019-07-17 RX ORDER — TAMSULOSIN HYDROCHLORIDE 0.4 MG/1
0 CAPSULE ORAL
Qty: 0 | Refills: 0 | DISCHARGE

## 2019-07-17 RX ORDER — LINACLOTIDE 145 UG/1
0 CAPSULE, GELATIN COATED ORAL
Qty: 0 | Refills: 0 | DISCHARGE

## 2019-07-17 RX ORDER — TRAZODONE HCL 50 MG
0 TABLET ORAL
Qty: 0 | Refills: 0 | DISCHARGE

## 2019-07-17 RX ADMIN — ESCITALOPRAM OXALATE 10 MILLIGRAM(S): 10 TABLET, FILM COATED ORAL at 17:29

## 2019-07-17 RX ADMIN — MORPHINE SULFATE 15 MILLIGRAM(S): 50 CAPSULE, EXTENDED RELEASE ORAL at 17:35

## 2019-07-17 RX ADMIN — APIXABAN 10 MILLIGRAM(S): 2.5 TABLET, FILM COATED ORAL at 05:09

## 2019-07-17 RX ADMIN — LOSARTAN POTASSIUM 100 MILLIGRAM(S): 100 TABLET, FILM COATED ORAL at 05:09

## 2019-07-17 RX ADMIN — LACTULOSE 10 GRAM(S): 10 SOLUTION ORAL at 05:09

## 2019-07-17 RX ADMIN — PRIMIDONE 50 MILLIGRAM(S): 250 TABLET ORAL at 17:29

## 2019-07-17 RX ADMIN — PRIMIDONE 50 MILLIGRAM(S): 250 TABLET ORAL at 05:09

## 2019-07-17 RX ADMIN — MORPHINE SULFATE 15 MILLIGRAM(S): 50 CAPSULE, EXTENDED RELEASE ORAL at 09:27

## 2019-07-17 RX ADMIN — PANTOPRAZOLE SODIUM 40 MILLIGRAM(S): 20 TABLET, DELAYED RELEASE ORAL at 06:01

## 2019-07-17 RX ADMIN — MORPHINE SULFATE 15 MILLIGRAM(S): 50 CAPSULE, EXTENDED RELEASE ORAL at 10:30

## 2019-07-17 RX ADMIN — Medication 100 MILLIGRAM(S): at 05:09

## 2019-07-17 RX ADMIN — APIXABAN 10 MILLIGRAM(S): 2.5 TABLET, FILM COATED ORAL at 17:29

## 2019-07-17 NOTE — PROGRESS NOTE ADULT - SUBJECTIVE AND OBJECTIVE BOX
Subjective: Patient seen and examined. No new events except as noted.   doing well  cont to have back pain, controlled with meds     REVIEW OF SYSTEMS:    CONSTITUTIONAL: No weakness, fevers or chills  EYES/ENT: No visual changes;  No vertigo or throat pain   NECK: No pain or stiffness  RESPIRATORY: No cough, wheezing, hemoptysis; No shortness of breath  CARDIOVASCULAR: No chest pain or palpitations  GASTROINTESTINAL: No abdominal or epigastric pain. No nausea, vomiting, or hematemesis; No diarrhea or constipation. No melena or hematochezia.  GENITOURINARY: No dysuria, frequency or hematuria  NEUROLOGICAL: back pain  SKIN: No itching, burning, rashes, or lesions   All other review of systems is negative unless indicated above.    MEDICATIONS:  MEDICATIONS  (STANDING):  apixaban 10 milliGRAM(s) Oral every 12 hours  atorvastatin 10 milliGRAM(s) Oral at bedtime  docusate sodium 100 milliGRAM(s) Oral two times a day  escitalopram 10 milliGRAM(s) Oral daily  losartan 100 milliGRAM(s) Oral daily  pantoprazole    Tablet 40 milliGRAM(s) Oral before breakfast  polyethylene glycol 3350 17 Gram(s) Oral daily  primidone 50 milliGRAM(s) Oral two times a day  senna 2 Tablet(s) Oral at bedtime  tamsulosin 0.4 milliGRAM(s) Oral at bedtime      PHYSICAL EXAM:  T(C): 36.8 (07-17-19 @ 12:34), Max: 36.9 (07-17-19 @ 08:22)  HR: 83 (07-17-19 @ 12:34) (77 - 91)  BP: 128/73 (07-17-19 @ 12:34) (122/74 - 135/84)  RR: 18 (07-17-19 @ 12:34) (18 - 18)  SpO2: 94% (07-17-19 @ 12:34) (91% - 95%)  Wt(kg): --  I&O's Summary    16 Jul 2019 07:01  -  17 Jul 2019 07:00  --------------------------------------------------------  IN: 960 mL / OUT: 200 mL / NET: 760 mL          Appearance: Normal	  HEENT:   Normal oral mucosa, PERRL, EOMI	  Lymphatic: No lymphadenopathy , no edema  Cardiovascular: Normal S1 S2, No JVD, No murmurs , Peripheral pulses palpable 2+ bilaterally  Respiratory: Lungs clear to auscultation, normal effort 	  Gastrointestinal:  Soft, Non-tender, + BS	  Skin: No rashes, No ecchymoses, No cyanosis, warm to touch  Musculoskeletal: motor intact, laying flat, pain on lower back palpation   Psychiatry:  Mood & affect appropriate  Ext: No edema      All labs, Imaging and EKGs personally reviewed                           13.1   6.05  )-----------( 241      ( 16 Jul 2019 09:56 )             39.8               07-16    133<L>  |  95<L>  |  8   ----------------------------<  91  4.0   |  26  |  0.60    Ca    8.3<L>      16 Jul 2019 07:03

## 2019-07-17 NOTE — PROGRESS NOTE ADULT - SUBJECTIVE AND OBJECTIVE BOX
LESLI YANG  MRN-[id number]    Patient is a 85y old  Male who presents with a chief complaint of GIB (17 Jul 2019 11:05)      Review of System    Patietn only speak Welsh.  Daughter at bedside assists with translation    Current Meds  MEDICATIONS  (STANDING):  apixaban 10 milliGRAM(s) Oral every 12 hours  atorvastatin 10 milliGRAM(s) Oral at bedtime  docusate sodium 100 milliGRAM(s) Oral two times a day  escitalopram 10 milliGRAM(s) Oral daily  losartan 100 milliGRAM(s) Oral daily  pantoprazole    Tablet 40 milliGRAM(s) Oral before breakfast  polyethylene glycol 3350 17 Gram(s) Oral daily  primidone 50 milliGRAM(s) Oral two times a day  senna 2 Tablet(s) Oral at bedtime  tamsulosin 0.4 milliGRAM(s) Oral at bedtime    MEDICATIONS  (PRN):  acetaminophen   Tablet .. 650 milliGRAM(s) Oral every 6 hours PRN Mild Pain (1 - 3), Moderate Pain (4 - 6)  morphine  IR 15 milliGRAM(s) Oral every 6 hours PRN Severe Pain (7 - 10)  ondansetron Injectable 4 milliGRAM(s) IV Push every 8 hours PRN Nausea and/or Vomiting  simethicone 80 milliGRAM(s) Chew every 4 hours PRN Gas    Vital Signs Last 24 Hrs  T(C): 36.8 (17 Jul 2019 12:34), Max: 36.9 (17 Jul 2019 08:22)  T(F): 98.2 (17 Jul 2019 12:34), Max: 98.4 (17 Jul 2019 08:22)  HR: 83 (17 Jul 2019 12:34) (77 - 91)  BP: 128/73 (17 Jul 2019 12:34) (122/74 - 135/84)  BP(mean): --  RR: 18 (17 Jul 2019 12:34) (18 - 18)  SpO2: 94% (17 Jul 2019 12:34) (91% - 95%)    PHYSICAL EXAM:      Constitutional: NAD      Lab  CBC Full  -  ( 16 Jul 2019 09:56 )  WBC Count : 6.05 K/uL  RBC Count : 4.16 M/uL  Hemoglobin : 13.1 g/dL  Hematocrit : 39.8 %  Platelet Count - Automated : 241 K/uL  Mean Cell Volume : 95.7 fl  Mean Cell Hemoglobin : 31.5 pg  Mean Cell Hemoglobin Concentration : 32.9 gm/dL  Auto Neutrophil # : x  Auto Lymphocyte # : x  Auto Monocyte # : x  Auto Eosinophil # : x  Auto Basophil # : x  Auto Neutrophil % : x  Auto Lymphocyte % : x  Auto Monocyte % : x  Auto Eosinophil % : x  Auto Basophil % : x    07-16    133<L>  |  95<L>  |  8   ----------------------------<  91  4.0   |  26  |  0.60    Ca    8.3<L>      16 Jul 2019 07:03          Rad:    Assessment/Plan

## 2019-07-17 NOTE — PROGRESS NOTE ADULT - ASSESSMENT
84 y/o man with LE dvt.  Now on AC.  AC started.  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  Remainder of hypercoagulation work up can be skewed by DVT, would hold off for now.  CT c/a/p unremarkable.

## 2019-07-17 NOTE — PROGRESS NOTE ADULT - PROBLEM SELECTOR PLAN 7
DVT and GI PPX   pain and nausea meds PRN  F/U with PMD and ortho after discharge for back pain management

## 2019-07-17 NOTE — PROGRESS NOTE ADULT - SUBJECTIVE AND OBJECTIVE BOX
LESLI YANG:[id number],   85yMale followed for:  No Known Allergies    PAST MEDICAL & SURGICAL HISTORY:  BPH (benign prostatic hyperplasia)  Depression  High cholesterol  Hypertension  H/O Spinal surgery    FAMILY HISTORY:    MEDICATIONS  (STANDING):  apixaban 10 milliGRAM(s) Oral every 12 hours  atorvastatin 10 milliGRAM(s) Oral at bedtime  docusate sodium 100 milliGRAM(s) Oral two times a day  escitalopram 10 milliGRAM(s) Oral daily  losartan 100 milliGRAM(s) Oral daily  pantoprazole    Tablet 40 milliGRAM(s) Oral before breakfast  polyethylene glycol 3350 17 Gram(s) Oral daily  primidone 50 milliGRAM(s) Oral two times a day  senna 2 Tablet(s) Oral at bedtime  tamsulosin 0.4 milliGRAM(s) Oral at bedtime    MEDICATIONS  (PRN):  acetaminophen   Tablet .. 650 milliGRAM(s) Oral every 6 hours PRN Mild Pain (1 - 3), Moderate Pain (4 - 6)  morphine  IR 15 milliGRAM(s) Oral every 6 hours PRN Severe Pain (7 - 10)  ondansetron Injectable 4 milliGRAM(s) IV Push every 8 hours PRN Nausea and/or Vomiting  simethicone 80 milliGRAM(s) Chew every 4 hours PRN Gas      Vital Signs Last 24 Hrs  T(C): 36.9 (17 Jul 2019 08:22), Max: 36.9 (17 Jul 2019 08:22)  T(F): 98.4 (17 Jul 2019 08:22), Max: 98.4 (17 Jul 2019 08:22)  HR: 77 (17 Jul 2019 08:22) (77 - 91)  BP: 122/74 (17 Jul 2019 08:22) (122/74 - 135/84)  BP(mean): --  RR: 18 (17 Jul 2019 08:22) (18 - 18)  SpO2: 93% (17 Jul 2019 08:22) (91% - 95%)  nc/at  s1s2  cta  soft, nt, nd no guarding or rebound  no c/c/e    CBC Full  -  ( 16 Jul 2019 09:56 )  WBC Count : 6.05 K/uL  RBC Count : 4.16 M/uL  Hemoglobin : 13.1 g/dL  Hematocrit : 39.8 %  Platelet Count - Automated : 241 K/uL  Mean Cell Volume : 95.7 fl  Mean Cell Hemoglobin : 31.5 pg  Mean Cell Hemoglobin Concentration : 32.9 gm/dL  Auto Neutrophil # : x  Auto Lymphocyte # : x  Auto Monocyte # : x  Auto Eosinophil # : x  Auto Basophil # : x  Auto Neutrophil % : x  Auto Lymphocyte % : x  Auto Monocyte % : x  Auto Eosinophil % : x  Auto Basophil % : x    07-16    133<L>  |  95<L>  |  8   ----------------------------<  91  4.0   |  26  |  0.60    Ca    8.3<L>      16 Jul 2019 07:03

## 2019-07-17 NOTE — PROGRESS NOTE ADULT - ATTENDING COMMENTS
Advanced care planning was discussed with patient and family.  Advanced care planning forms were reviewed and discussed.  Differential diagnosis and plan of care discussed with patient after the evaluation.   Pain assessed and judicious use of narcotics when appropriate was discussed.  Importance of Fall prevention discussed.  Counseling on Smoking and Alcohol cessation was offered when appropriate.  Counseling on Diet, exercise, and medication compliance was done.

## 2019-07-17 NOTE — PROGRESS NOTE ADULT - PROBLEM SELECTOR PLAN 1
dark?black stool as per family Hg stable  protonix  Stool occult negative   GI eval appreciated   diet as tolerated,  trend CBC  active type and screen  no indication  for EGD colonoscopy  D/C planing to rehab

## 2019-07-17 NOTE — PROGRESS NOTE ADULT - PROBLEM SELECTOR PROBLEM 6
Prophylactic measure
BPH (benign prostatic hyperplasia)

## 2019-07-19 LAB — PTR INTERPRETATION: SIGNIFICANT CHANGE UP

## 2019-07-22 LAB — DNA PLOIDY SPEC FC-IMP: SIGNIFICANT CHANGE UP

## 2021-10-26 ENCOUNTER — TRANSCRIPTION ENCOUNTER (OUTPATIENT)
Age: 86
End: 2021-10-26

## 2021-10-27 ENCOUNTER — INPATIENT (INPATIENT)
Facility: HOSPITAL | Age: 86
LOS: 1 days | Discharge: NURSING, REHAB OR LONG-TERM CARE | DRG: 494 | End: 2021-10-29
Attending: INTERNAL MEDICINE | Admitting: INTERNAL MEDICINE
Payer: MEDICARE

## 2021-10-27 VITALS
TEMPERATURE: 98 F | DIASTOLIC BLOOD PRESSURE: 74 MMHG | WEIGHT: 169.98 LBS | OXYGEN SATURATION: 96 % | RESPIRATION RATE: 16 BRPM | HEART RATE: 93 BPM | SYSTOLIC BLOOD PRESSURE: 121 MMHG | HEIGHT: 65 IN

## 2021-10-27 DIAGNOSIS — S82.841A DISPLACED BIMALLEOLAR FRACTURE OF RIGHT LOWER LEG, INITIAL ENCOUNTER FOR CLOSED FRACTURE: ICD-10-CM

## 2021-10-27 DIAGNOSIS — Z98.890 OTHER SPECIFIED POSTPROCEDURAL STATES: Chronic | ICD-10-CM

## 2021-10-27 LAB
ALBUMIN SERPL ELPH-MCNC: 3.7 G/DL — SIGNIFICANT CHANGE UP (ref 3.3–5)
ALP SERPL-CCNC: 50 U/L — SIGNIFICANT CHANGE UP (ref 40–120)
ALT FLD-CCNC: 12 U/L — SIGNIFICANT CHANGE UP (ref 10–45)
ANION GAP SERPL CALC-SCNC: 11 MMOL/L — SIGNIFICANT CHANGE UP (ref 5–17)
APPEARANCE UR: CLEAR — SIGNIFICANT CHANGE UP
APTT BLD: 28 SEC — SIGNIFICANT CHANGE UP (ref 27.5–35.5)
AST SERPL-CCNC: 23 U/L — SIGNIFICANT CHANGE UP (ref 10–40)
BILIRUB SERPL-MCNC: 0.4 MG/DL — SIGNIFICANT CHANGE UP (ref 0.2–1.2)
BILIRUB UR-MCNC: NEGATIVE — SIGNIFICANT CHANGE UP
BUN SERPL-MCNC: 16 MG/DL — SIGNIFICANT CHANGE UP (ref 7–23)
CALCIUM SERPL-MCNC: 8.8 MG/DL — SIGNIFICANT CHANGE UP (ref 8.4–10.5)
CHLORIDE SERPL-SCNC: 107 MMOL/L — SIGNIFICANT CHANGE UP (ref 96–108)
CO2 SERPL-SCNC: 19 MMOL/L — LOW (ref 22–31)
COLOR SPEC: YELLOW — SIGNIFICANT CHANGE UP
CREAT SERPL-MCNC: 0.56 MG/DL — SIGNIFICANT CHANGE UP (ref 0.5–1.3)
DIFF PNL FLD: NEGATIVE — SIGNIFICANT CHANGE UP
GLUCOSE SERPL-MCNC: 68 MG/DL — LOW (ref 70–99)
GLUCOSE UR QL: NEGATIVE — SIGNIFICANT CHANGE UP
HCT VFR BLD CALC: 36.7 % — LOW (ref 39–50)
HGB BLD-MCNC: 12.2 G/DL — LOW (ref 13–17)
INR BLD: 1.05 RATIO — SIGNIFICANT CHANGE UP (ref 0.88–1.16)
KETONES UR-MCNC: NEGATIVE — SIGNIFICANT CHANGE UP
LEUKOCYTE ESTERASE UR-ACNC: NEGATIVE — SIGNIFICANT CHANGE UP
MCHC RBC-ENTMCNC: 32 PG — SIGNIFICANT CHANGE UP (ref 27–34)
MCHC RBC-ENTMCNC: 33.2 GM/DL — SIGNIFICANT CHANGE UP (ref 32–36)
MCV RBC AUTO: 96.3 FL — SIGNIFICANT CHANGE UP (ref 80–100)
NITRITE UR-MCNC: NEGATIVE — SIGNIFICANT CHANGE UP
NRBC # BLD: 0 /100 WBCS — SIGNIFICANT CHANGE UP (ref 0–0)
PH UR: 6.5 — SIGNIFICANT CHANGE UP (ref 5–8)
PLATELET # BLD AUTO: 187 K/UL — SIGNIFICANT CHANGE UP (ref 150–400)
POTASSIUM SERPL-MCNC: 4.2 MMOL/L — SIGNIFICANT CHANGE UP (ref 3.5–5.3)
POTASSIUM SERPL-SCNC: 4.2 MMOL/L — SIGNIFICANT CHANGE UP (ref 3.5–5.3)
PROT SERPL-MCNC: 6 G/DL — SIGNIFICANT CHANGE UP (ref 6–8.3)
PROT UR-MCNC: SIGNIFICANT CHANGE UP
PROTHROM AB SERPL-ACNC: 12.6 SEC — SIGNIFICANT CHANGE UP (ref 10.6–13.6)
RBC # BLD: 3.81 M/UL — LOW (ref 4.2–5.8)
RBC # FLD: 13.9 % — SIGNIFICANT CHANGE UP (ref 10.3–14.5)
SARS-COV-2 RNA SPEC QL NAA+PROBE: SIGNIFICANT CHANGE UP
SODIUM SERPL-SCNC: 137 MMOL/L — SIGNIFICANT CHANGE UP (ref 135–145)
SP GR SPEC: 1.02 — SIGNIFICANT CHANGE UP (ref 1.01–1.02)
UROBILINOGEN FLD QL: NEGATIVE — SIGNIFICANT CHANGE UP
WBC # BLD: 6.85 K/UL — SIGNIFICANT CHANGE UP (ref 3.8–10.5)
WBC # FLD AUTO: 6.85 K/UL — SIGNIFICANT CHANGE UP (ref 3.8–10.5)

## 2021-10-27 PROCEDURE — 99284 EMERGENCY DEPT VISIT MOD MDM: CPT

## 2021-10-27 PROCEDURE — 73610 X-RAY EXAM OF ANKLE: CPT | Mod: 26,RT,77

## 2021-10-27 PROCEDURE — 27814 TREATMENT OF ANKLE FRACTURE: CPT | Mod: RT

## 2021-10-27 PROCEDURE — 73590 X-RAY EXAM OF LOWER LEG: CPT | Mod: 26,RT,76

## 2021-10-27 PROCEDURE — 76377 3D RENDER W/INTRP POSTPROCES: CPT | Mod: 26

## 2021-10-27 PROCEDURE — 73620 X-RAY EXAM OF FOOT: CPT | Mod: 26,RT

## 2021-10-27 PROCEDURE — 71045 X-RAY EXAM CHEST 1 VIEW: CPT | Mod: 26

## 2021-10-27 PROCEDURE — 93010 ELECTROCARDIOGRAM REPORT: CPT

## 2021-10-27 PROCEDURE — 73610 X-RAY EXAM OF ANKLE: CPT | Mod: 26,RT

## 2021-10-27 PROCEDURE — 73700 CT LOWER EXTREMITY W/O DYE: CPT | Mod: 26,RT

## 2021-10-27 RX ORDER — TRAZODONE HCL 50 MG
50 TABLET ORAL AT BEDTIME
Refills: 0 | Status: DISCONTINUED | OUTPATIENT
Start: 2021-10-27 | End: 2021-10-27

## 2021-10-27 RX ORDER — FINASTERIDE 5 MG/1
5 TABLET, FILM COATED ORAL DAILY
Refills: 0 | Status: DISCONTINUED | OUTPATIENT
Start: 2021-10-27 | End: 2021-10-27

## 2021-10-27 RX ORDER — TRAMADOL HYDROCHLORIDE 50 MG/1
50 TABLET ORAL EVERY 6 HOURS
Refills: 0 | Status: DISCONTINUED | OUTPATIENT
Start: 2021-10-27 | End: 2021-10-28

## 2021-10-27 RX ORDER — ENOXAPARIN SODIUM 100 MG/ML
40 INJECTION SUBCUTANEOUS EVERY 24 HOURS
Refills: 0 | Status: DISCONTINUED | OUTPATIENT
Start: 2021-10-27 | End: 2021-10-27

## 2021-10-27 RX ORDER — ENOXAPARIN SODIUM 100 MG/ML
40 INJECTION SUBCUTANEOUS EVERY 24 HOURS
Refills: 0 | Status: DISCONTINUED | OUTPATIENT
Start: 2021-10-28 | End: 2021-10-29

## 2021-10-27 RX ORDER — CEFAZOLIN SODIUM 1 G
2000 VIAL (EA) INJECTION EVERY 8 HOURS
Refills: 0 | Status: COMPLETED | OUTPATIENT
Start: 2021-10-27 | End: 2021-10-28

## 2021-10-27 RX ORDER — MORPHINE SULFATE 50 MG/1
4 CAPSULE, EXTENDED RELEASE ORAL ONCE
Refills: 0 | Status: DISCONTINUED | OUTPATIENT
Start: 2021-10-27 | End: 2021-10-27

## 2021-10-27 RX ORDER — ATORVASTATIN CALCIUM 80 MG/1
10 TABLET, FILM COATED ORAL AT BEDTIME
Refills: 0 | Status: DISCONTINUED | OUTPATIENT
Start: 2021-10-27 | End: 2021-10-27

## 2021-10-27 RX ORDER — PRIMIDONE 250 MG/1
50 TABLET ORAL DAILY
Refills: 0 | Status: DISCONTINUED | OUTPATIENT
Start: 2021-10-27 | End: 2021-10-27

## 2021-10-27 RX ORDER — HYDROMORPHONE HYDROCHLORIDE 2 MG/ML
0.25 INJECTION INTRAMUSCULAR; INTRAVENOUS; SUBCUTANEOUS
Refills: 0 | Status: DISCONTINUED | OUTPATIENT
Start: 2021-10-27 | End: 2021-10-28

## 2021-10-27 RX ORDER — INFLUENZA VIRUS VACCINE 15; 15; 15; 15 UG/.5ML; UG/.5ML; UG/.5ML; UG/.5ML
0.5 SUSPENSION INTRAMUSCULAR ONCE
Refills: 0 | Status: DISCONTINUED | OUTPATIENT
Start: 2021-10-27 | End: 2021-10-29

## 2021-10-27 RX ORDER — OXYCODONE HYDROCHLORIDE 5 MG/1
5 TABLET ORAL EVERY 4 HOURS
Refills: 0 | Status: DISCONTINUED | OUTPATIENT
Start: 2021-10-27 | End: 2021-10-28

## 2021-10-27 RX ORDER — TAMSULOSIN HYDROCHLORIDE 0.4 MG/1
0.4 CAPSULE ORAL AT BEDTIME
Refills: 0 | Status: DISCONTINUED | OUTPATIENT
Start: 2021-10-27 | End: 2021-10-27

## 2021-10-27 RX ORDER — OXYCODONE HYDROCHLORIDE 5 MG/1
10 TABLET ORAL EVERY 4 HOURS
Refills: 0 | Status: DISCONTINUED | OUTPATIENT
Start: 2021-10-27 | End: 2021-10-28

## 2021-10-27 RX ORDER — AMLODIPINE BESYLATE 2.5 MG/1
10 TABLET ORAL DAILY
Refills: 0 | Status: DISCONTINUED | OUTPATIENT
Start: 2021-10-27 | End: 2021-10-27

## 2021-10-27 RX ORDER — LOSARTAN POTASSIUM 100 MG/1
100 TABLET, FILM COATED ORAL DAILY
Refills: 0 | Status: DISCONTINUED | OUTPATIENT
Start: 2021-10-27 | End: 2021-10-27

## 2021-10-27 RX ADMIN — OXYCODONE HYDROCHLORIDE 5 MILLIGRAM(S): 5 TABLET ORAL at 23:00

## 2021-10-27 RX ADMIN — MORPHINE SULFATE 4 MILLIGRAM(S): 50 CAPSULE, EXTENDED RELEASE ORAL at 19:07

## 2021-10-27 RX ADMIN — Medication 50 MILLIGRAM(S): at 18:37

## 2021-10-27 RX ADMIN — MORPHINE SULFATE 4 MILLIGRAM(S): 50 CAPSULE, EXTENDED RELEASE ORAL at 10:59

## 2021-10-27 RX ADMIN — MORPHINE SULFATE 4 MILLIGRAM(S): 50 CAPSULE, EXTENDED RELEASE ORAL at 11:15

## 2021-10-27 RX ADMIN — MORPHINE SULFATE 4 MILLIGRAM(S): 50 CAPSULE, EXTENDED RELEASE ORAL at 18:37

## 2021-10-27 NOTE — H&P ADULT - ASSESSMENT
Patient is a 86 Y/O Male with PMhx of HTN, HLD, BPH, DVT in the past (not on AC, completed), presenting with R ankle pain/deformity and inability to bear weight s/p mechanical fall. The grandson states that the patient was walking outside when he rolled his ankle and fell. Denies headstrike/LOC. Denies numbness/tingling in the feet/toes. No other bone or joint complaints. Patient is ambulatory with a cane at home. Patient lives at home with his wife. found to have ankle fracture.     # R tirmalleolar ankle fracture s/p closed reduction and splinting  Ortho eval appreciated  plan for OR   pain control   NWB, fall precautions   Active cardiac conditions: No ACS, no decompensated CHF, no life threatening or significant arrythmia, no significant valvular diseaseand no recent stent placement.   -Risk of surgery: intermediate risk surgery, patient is medically optimized for OR     # HTN   resume home meds for BP control   monitor     # HLD   statin  DASH diet when oral intak e    # BPH  Resume home meds        # Anxiety      DVT and Gi PPX

## 2021-10-27 NOTE — PRE-ANESTHESIA EVALUATION ADULT - NSRADCARDRESULTSFT_GEN_ALL_CORE
TTE 7/2019 EF70%; Conclusions:  Suboptimal image quality.  1. Aortic valve not well visualized. Peak transaortic valve  gradient equals 4 mm Hg. Mild aortic regurgitation.  2. Mild left ventricular hypertrophy.  3. Hyperdynamic left ventricular systolic function.  4. The right ventricle is not well visualized.  Overall  right ventricular systolic function appears preserved based  on limited views.  Tricuspid annulus systolic velocity (S')  about  16 cm/s.  5. Estimated pulmonary artery systolic pressure equals 36  mm Hg, assuming right atrial pressure equals 8 mm Hg,  consistent with borderline pulmonary hypertension.

## 2021-10-27 NOTE — H&P ADULT - HISTORY OF PRESENT ILLNESS
Patient is a 88 Y/O Male with PMhx of HTN, HLD, BPH, DVT in the past (not on AC, completed), presenting with R ankle pain/deformity and inability to bear weight s/p mechanical fall. The grandson states that the patient was walking outside when he rolled his ankle and fell. Denies headstrike/LOC. Denies numbness/tingling in the feet/toes. No other bone or joint complaints. Patient is ambulatory with a cane at home. Patient lives at home with his wife. found to have ankle fracture.

## 2021-10-27 NOTE — ED PROVIDER NOTE - PHYSICAL EXAMINATION
Attn - alert, nad, head - NC/AT, no facial tenderness, mandible and TMJ are NT, tongue - no trauma, PERRL 3 mm, nose - NT, no deformity or signs of epistaxis, neck/spine/back - NT, Chest/ribs - NT, Lungs - clear, good BS bilaterally without splinting, Cor - RR, no M, Abdo - soft, NT, ND, no HSM or tenderness, no ecchymosis or signs of trauma, no CVAT, Pelvis/hips - NT, and no pain with AROM.  UExt - FROM without pain, NT, LExt - deformity to R ankle with ER of foot, + distal pulses, sensation intact.  tender entire ankle.  good cap refill.   Neuro - CN, motor, sensory, cerebellar, speech intact and non focal

## 2021-10-27 NOTE — ED PROVIDER NOTE - CLINICAL SUMMARY MEDICAL DECISION MAKING FREE TEXT BOX
Attn - trip and fall with obvious deformity of R ankle - Fx +/- dislocation.  analgesia, preop labs, Xray foot/ankle/tib/fib.  reduction.

## 2021-10-27 NOTE — ED ADULT NURSE NOTE - OBJECTIVE STATEMENT
86 y/o male presents to ED via EMS reporting ankle pain via EMS. EMS reports pt had a mechanical fall when going to routine doctor's appointment today. Pt denies hitting head. On exam, AOx3, answering questions. Unlabored, spontaneous respirations, NAD. Abdomen soft, non-tender, non-distended. Per MD Barahona, ankle to remain wrapped by EMS until after imaging. 86 y/o male presents to ED via EMS reporting ankle pain via EMS. EMS reports pt had a mechanical fall when going to routine doctor's appointment today. Pt denies hitting head. Pt reports R ankle pain. On exam, AOx3, answering questions. Obvious deformity to R ankle, laceration noted, +2 peripheral pulses. Unlabored, spontaneous respirations, NAD. Abdomen soft, non-tender, non-distended. Per MD Barahona, ankle to remain wrapped by EMS until after imaging.

## 2021-10-27 NOTE — PRE-ANESTHESIA EVALUATION ADULT - NSANTHOSAYNRD_GEN_A_CORE
No. PEYTON screening performed.  STOP BANG Legend: 0-2 = LOW Risk; 3-4 = INTERMEDIATE Risk; 5-8 = HIGH Risk

## 2021-10-27 NOTE — H&P ADULT - NSHPREVIEWOFSYSTEMS_GEN_ALL_CORE
REVIEW OF SYSTEMS:    CONSTITUTIONAL: No weakness, fevers or chills  EYES/ENT: No visual changes;  No vertigo or throat pain   NECK: No pain or stiffness  RESPIRATORY: No cough, wheezing, hemoptysis; No shortness of breath  CARDIOVASCULAR: No chest pain or palpitations  GASTROINTESTINAL: No abdominal or epigastric pain. No nausea, vomiting, or hematemesis; No diarrhea or constipation. No melena or hematochezia.  GENITOURINARY: No dysuria, frequency or hematuria  NEUROLOGICAL: ankle pain   SKIN: No itching, burning, rashes, or lesions   All other review of systems is negative unless indicated above.

## 2021-10-27 NOTE — ED PROVIDER NOTE - OBJECTIVE STATEMENT
Attn - pt seen in [facility] - BIB EMS - leaving doctors office and fell with acute pain and deformity to R ankle per EMS and splinted.  Sinhala speaking and interpreted by grandson.  pt denies other injury and c/o severe pain in ankle.  no numbness.  PMHx - HTN.  NKDA.

## 2021-10-27 NOTE — PRE-ANESTHESIA EVALUATION ADULT - NSANTHPMHFT_GEN_ALL_CORE
88 Y/O Male with PMhx of HTN, HLD, BPH, DVT in the past (not on AC, completed), s/p fall with right ankle fx

## 2021-10-27 NOTE — H&P ADULT - NSHPPHYSICALEXAM_GEN_ALL_CORE
Vital Signs Last 24 Hrs  T(C): 36.4 (27 Oct 2021 12:48), Max: 36.9 (27 Oct 2021 10:04)  T(F): 97.5 (27 Oct 2021 12:48), Max: 98.4 (27 Oct 2021 10:04)  HR: 88 (27 Oct 2021 12:48) (88 - 94)  BP: 121/74 (27 Oct 2021 12:48) (121/74 - 129/75)  BP(mean): --  RR: 18 (27 Oct 2021 12:48) (16 - 18)  SpO2: 96% (27 Oct 2021 12:48) (96% - 97%)    Appearance: Normal	  HEENT:   Normal oral mucosa, PERRL, EOMI	  Lymphatic: No lymphadenopathy , no edema  Cardiovascular: Normal S1 S2, No JVD, No murmurs , Peripheral pulses palpable 2+ bilaterally  Respiratory: Lungs clear to auscultation, normal effort 	  Gastrointestinal:  Soft, Non-tender, + BS	  Skin: No rashes, No ecchymoses, No cyanosis, warm to touch  Musculoskeletal: R LE splint   Psychiatry:  Mood & affect appropriate  Ext: No edema

## 2021-10-28 LAB
ANION GAP SERPL CALC-SCNC: 11 MMOL/L — SIGNIFICANT CHANGE UP (ref 5–17)
BUN SERPL-MCNC: 12 MG/DL — SIGNIFICANT CHANGE UP (ref 7–23)
CALCIUM SERPL-MCNC: 8.5 MG/DL — SIGNIFICANT CHANGE UP (ref 8.4–10.5)
CHLORIDE SERPL-SCNC: 104 MMOL/L — SIGNIFICANT CHANGE UP (ref 96–108)
CO2 SERPL-SCNC: 20 MMOL/L — LOW (ref 22–31)
CREAT SERPL-MCNC: 0.56 MG/DL — SIGNIFICANT CHANGE UP (ref 0.5–1.3)
GLUCOSE SERPL-MCNC: 107 MG/DL — HIGH (ref 70–99)
HCT VFR BLD CALC: 31.5 % — LOW (ref 39–50)
HGB BLD-MCNC: 10.7 G/DL — LOW (ref 13–17)
MCHC RBC-ENTMCNC: 31.5 PG — SIGNIFICANT CHANGE UP (ref 27–34)
MCHC RBC-ENTMCNC: 34 GM/DL — SIGNIFICANT CHANGE UP (ref 32–36)
MCV RBC AUTO: 92.6 FL — SIGNIFICANT CHANGE UP (ref 80–100)
NRBC # BLD: 0 /100 WBCS — SIGNIFICANT CHANGE UP (ref 0–0)
PLATELET # BLD AUTO: 177 K/UL — SIGNIFICANT CHANGE UP (ref 150–400)
POTASSIUM SERPL-MCNC: 4.3 MMOL/L — SIGNIFICANT CHANGE UP (ref 3.5–5.3)
POTASSIUM SERPL-SCNC: 4.3 MMOL/L — SIGNIFICANT CHANGE UP (ref 3.5–5.3)
RBC # BLD: 3.4 M/UL — LOW (ref 4.2–5.8)
RBC # FLD: 13.5 % — SIGNIFICANT CHANGE UP (ref 10.3–14.5)
SODIUM SERPL-SCNC: 135 MMOL/L — SIGNIFICANT CHANGE UP (ref 135–145)
WBC # BLD: 5.18 K/UL — SIGNIFICANT CHANGE UP (ref 3.8–10.5)
WBC # FLD AUTO: 5.18 K/UL — SIGNIFICANT CHANGE UP (ref 3.8–10.5)

## 2021-10-28 RX ORDER — TRAMADOL HYDROCHLORIDE 50 MG/1
25 TABLET ORAL EVERY 6 HOURS
Refills: 0 | Status: DISCONTINUED | OUTPATIENT
Start: 2021-10-28 | End: 2021-10-29

## 2021-10-28 RX ORDER — ACETAMINOPHEN 500 MG
975 TABLET ORAL EVERY 8 HOURS
Refills: 0 | Status: DISCONTINUED | OUTPATIENT
Start: 2021-10-28 | End: 2021-10-29

## 2021-10-28 RX ORDER — ATORVASTATIN CALCIUM 80 MG/1
10 TABLET, FILM COATED ORAL AT BEDTIME
Refills: 0 | Status: DISCONTINUED | OUTPATIENT
Start: 2021-10-28 | End: 2021-10-29

## 2021-10-28 RX ORDER — FINASTERIDE 5 MG/1
5 TABLET, FILM COATED ORAL DAILY
Refills: 0 | Status: DISCONTINUED | OUTPATIENT
Start: 2021-10-28 | End: 2021-10-29

## 2021-10-28 RX ORDER — ACETAMINOPHEN 500 MG
1000 TABLET ORAL ONCE
Refills: 0 | Status: COMPLETED | OUTPATIENT
Start: 2021-10-28 | End: 2021-10-28

## 2021-10-28 RX ORDER — LOSARTAN POTASSIUM 100 MG/1
100 TABLET, FILM COATED ORAL DAILY
Refills: 0 | Status: DISCONTINUED | OUTPATIENT
Start: 2021-10-28 | End: 2021-10-29

## 2021-10-28 RX ORDER — TRAZODONE HCL 50 MG
50 TABLET ORAL AT BEDTIME
Refills: 0 | Status: DISCONTINUED | OUTPATIENT
Start: 2021-10-28 | End: 2021-10-29

## 2021-10-28 RX ORDER — AMLODIPINE BESYLATE 2.5 MG/1
10 TABLET ORAL DAILY
Refills: 0 | Status: DISCONTINUED | OUTPATIENT
Start: 2021-10-28 | End: 2021-10-29

## 2021-10-28 RX ORDER — LANOLIN ALCOHOL/MO/W.PET/CERES
3 CREAM (GRAM) TOPICAL AT BEDTIME
Refills: 0 | Status: DISCONTINUED | OUTPATIENT
Start: 2021-10-28 | End: 2021-10-29

## 2021-10-28 RX ORDER — PRIMIDONE 250 MG/1
50 TABLET ORAL DAILY
Refills: 0 | Status: DISCONTINUED | OUTPATIENT
Start: 2021-10-28 | End: 2021-10-29

## 2021-10-28 RX ORDER — TAMSULOSIN HYDROCHLORIDE 0.4 MG/1
0.4 CAPSULE ORAL AT BEDTIME
Refills: 0 | Status: DISCONTINUED | OUTPATIENT
Start: 2021-10-28 | End: 2021-10-29

## 2021-10-28 RX ORDER — TRAMADOL HYDROCHLORIDE 50 MG/1
50 TABLET ORAL EVERY 6 HOURS
Refills: 0 | Status: DISCONTINUED | OUTPATIENT
Start: 2021-10-28 | End: 2021-10-29

## 2021-10-28 RX ADMIN — Medication 3 MILLIGRAM(S): at 04:13

## 2021-10-28 RX ADMIN — TRAMADOL HYDROCHLORIDE 50 MILLIGRAM(S): 50 TABLET ORAL at 16:06

## 2021-10-28 RX ADMIN — Medication 50 MILLIGRAM(S): at 21:41

## 2021-10-28 RX ADMIN — TRAMADOL HYDROCHLORIDE 50 MILLIGRAM(S): 50 TABLET ORAL at 15:32

## 2021-10-28 RX ADMIN — PRIMIDONE 50 MILLIGRAM(S): 250 TABLET ORAL at 11:47

## 2021-10-28 RX ADMIN — OXYCODONE HYDROCHLORIDE 5 MILLIGRAM(S): 5 TABLET ORAL at 07:50

## 2021-10-28 RX ADMIN — TRAMADOL HYDROCHLORIDE 50 MILLIGRAM(S): 50 TABLET ORAL at 22:10

## 2021-10-28 RX ADMIN — Medication 400 MILLIGRAM(S): at 08:54

## 2021-10-28 RX ADMIN — ENOXAPARIN SODIUM 40 MILLIGRAM(S): 100 INJECTION SUBCUTANEOUS at 07:30

## 2021-10-28 RX ADMIN — Medication 50 MILLIGRAM(S): at 16:34

## 2021-10-28 RX ADMIN — FINASTERIDE 5 MILLIGRAM(S): 5 TABLET, FILM COATED ORAL at 11:44

## 2021-10-28 RX ADMIN — ATORVASTATIN CALCIUM 10 MILLIGRAM(S): 80 TABLET, FILM COATED ORAL at 21:41

## 2021-10-28 RX ADMIN — TRAMADOL HYDROCHLORIDE 50 MILLIGRAM(S): 50 TABLET ORAL at 21:40

## 2021-10-28 RX ADMIN — Medication 100 MILLIGRAM(S): at 11:44

## 2021-10-28 RX ADMIN — TAMSULOSIN HYDROCHLORIDE 0.4 MILLIGRAM(S): 0.4 CAPSULE ORAL at 21:41

## 2021-10-28 RX ADMIN — OXYCODONE HYDROCHLORIDE 5 MILLIGRAM(S): 5 TABLET ORAL at 07:33

## 2021-10-28 RX ADMIN — Medication 1000 MILLIGRAM(S): at 09:06

## 2021-10-28 RX ADMIN — Medication 100 MILLIGRAM(S): at 04:04

## 2021-10-28 RX ADMIN — OXYCODONE HYDROCHLORIDE 5 MILLIGRAM(S): 5 TABLET ORAL at 00:00

## 2021-10-28 NOTE — PHYSICAL THERAPY INITIAL EVALUATION ADULT - PERTINENT HX OF CURRENT PROBLEM, REHAB EVAL
86 yo M with R ankle pain/deformity and inability to bear weight s/p mechanical fall. The grandson states that the patient was walking outside when he rolled his ankle and fell. Denies headstrike/LOC. Now s/p ORIF R Ankle 10/27.

## 2021-10-28 NOTE — OCCUPATIONAL THERAPY INITIAL EVALUATION ADULT - SITTING BALANCE: DYNAMIC
Recent Results (from the past 8 hour(s))   POTASSIUM    Collection Time: 07/10/18  2:59 PM   Result Value Ref Range    Potassium 3.7 3.5 - 5.1 mmol/L good balance

## 2021-10-28 NOTE — OCCUPATIONAL THERAPY INITIAL EVALUATION ADULT - PERTINENT HX OF CURRENT PROBLEM, REHAB EVAL
86 Y/O Male with PMhx of HTN, HLD, BPH, DVT in the past (not on AC, completed), presenting with R ankle pain/deformity and inability to bear weight s/p mechanical fall. The grandson states that the patient was walking outside when he rolled his ankle and fell. Patient is ambulatory with a cane at home. Patient lives at home with his wife.

## 2021-10-28 NOTE — PHYSICAL THERAPY INITIAL EVALUATION ADULT - ADDITIONAL COMMENTS
Pt lives in [facility], [place] with spouse +elevator access. Pt was ambulatory with a rolling walker indoors and a straight cane outdoors.

## 2021-10-28 NOTE — PHYSICAL THERAPY INITIAL EVALUATION ADULT - DISCHARGE DISPOSITION, PT EVAL
subacute. IF pt goes home, assist rec for ALL functional mobility and home PT to inc strength and balance/rehabilitation facility

## 2021-10-28 NOTE — PROGRESS NOTE ADULT - SUBJECTIVE AND OBJECTIVE BOX
Postop Check    Patient tolerated the procedure well. Patient seen and examined at bedside.  No acute complaints at this time. Pain well controlled. Denies chest pain, shortness of breath, nausea or vomiting.     PE:  Vital Signs Last 24 Hrs  T(C): 36.1 (10-27-21 @ 21:45), Max: 36.9 (10-27-21 @ 10:04)  T(F): 97 (10-27-21 @ 21:45), Max: 98.4 (10-27-21 @ 10:04)  HR: 99 (10-27-21 @ 23:30) (87 - 104)  BP: 132/69 (10-27-21 @ 23:30) (109/57 - 149/67)  BP(mean): 95 (10-27-21 @ 23:30) (77 - 97)  RR: 18 (10-27-21 @ 23:30) (16 - 20)  SpO2: 98% (10-27-21 @ 23:30) (95% - 99%)    General: NAD, resting comfortably in bed  RLE:   Cast in place  SCD in place bilaterally  No calf tenderness   EHL/FHL+  SILT to the toes distally  Brisk cap refill                          12.2   6.85  )-----------( 187      ( 27 Oct 2021 12:53 )             36.7     27 Oct 2021 12:53    137    |  107    |  16     ----------------------------<  68     4.2     |  19     |  0.56     Ca    8.8        27 Oct 2021 12:53    TPro  6.0    /  Alb  3.7    /  TBili  0.4    /  DBili  x      /  AST  23     /  ALT  12     /  AlkPhos  50     27 Oct 2021 12:53    PT/INR - ( 27 Oct 2021 14:47 )   PT: 12.6 sec;   INR: 1.05 ratio         PTT - ( 27 Oct 2021 14:47 )  PTT:28.0 sec    A/P:  87y m s/p ORIF R Ankle POD 1  -PT/OT -NWB in a Short leg cast  -Pain Control  -DVT ppx Lovenox while in house  -Continue perioperative abx x 24 hours  -FU AM Labs  -Rest, ice, compress and elevate the extremity as we needed  -Incentive Spirometry  -Dispo planning    Ortho

## 2021-10-28 NOTE — PHYSICAL THERAPY INITIAL EVALUATION ADULT - GAIT DEVIATIONS NOTED, PT EVAL
increased time in double stance/decreased velocity of limb motion/decreased stride length/decreased weight-shifting ability

## 2021-10-28 NOTE — CONSULT NOTE ADULT - SUBJECTIVE AND OBJECTIVE BOX
Orthopedic Surgery Consult Note    87yMale p/w R ankle pain/deformity and inability to bear weight s/p mechanical fall. The grandson states that the patient was walking outside when he rolled his ankle and fell. Denies headstrike/LOC. Denies numbness/tingling in the feet/toes. No other bone or joint complaints. Patient is ambulatory with a cane at home. Patient lives at home with his wife.    Vital Signs Last 24 Hrs  T(C): 36.5 (10-27-21 @ 10:18), Max: 36.9 (10-27-21 @ 10:04)  T(F): 97.7 (10-27-21 @ 10:18), Max: 98.4 (10-27-21 @ 10:04)  HR: 94 (10-27-21 @ 10:18) (93 - 94)  BP: 129/75 (10-27-21 @ 10:18) (121/74 - 129/75)  BP(mean): --  RR: 18 (10-27-21 @ 10:18) (16 - 18)  SpO2: 97% (10-27-21 @ 10:18) (96% - 97%)    Physical Exam  Gen: Nad  R LE: small skin abrasion over the medial malleolus, +skin tenting medially +TTP medial/lateral malleolus  motor intact distally, moving toes  SILT s/s/sp/dp/t  + DP    Imaging:  XR showing trimalleolar ankle fracture  CT pending for possible preop planning    Procedure: Closed reduction performed followed by placement of a well padded trilam splint. Patient tolerated the procedure well. Post procedure imaging obtained and showed improved alignment. Post procedure the patient was NV intact.    A/P: 87yMale with R tirmalleolar ankle fracture s/p closed reduction and splinting  - Pain control  - NWB on affected extremity in splint  - Keep splint clean, dry and intact until follow up  - Ice/elevation  - Patient likely being admitted for inability to ambulate  - Will discuss possible surgical intervention for the Ankle fracture during this admission if swelling improves and patient wishes to pursue  - Please document medical clearance in the event surgical management is pursued  - Discussed with Dr Pemberton who is aware and agrees with the above      
DATE OF SERVICE: 10-28-21      CHIEF COMPLAINT:Patient is a 87y old  Male who presents with a chief complaint of     HISTORY OF PRESENT ILLNESS:HPI:  Patient is a 86 Y/O Male with PMhx of HTN, HLD, BPH, DVT in the past (not on AC, completed), presenting with R ankle pain/deformity and inability to bear weight s/p mechanical fall. The grandson states that the patient was walking outside when he rolled his ankle and fell. Denies headstrike/LOC. Denies numbness/tingling in the feet/toes. No other bone or joint complaints. Patient is ambulatory with a cane at home. Patient lives at home with his wife. found to have ankle fracture.    (27 Oct 2021 14:43)      PAST MEDICAL & SURGICAL HISTORY:  Hypertension    High cholesterol    Depression    BPH (benign prostatic hyperplasia)    H/O Spinal surgery            MEDICATIONS:  amLODIPine   Tablet 10 milliGRAM(s) Oral daily  enoxaparin Injectable 40 milliGRAM(s) SubCutaneous every 24 hours  losartan 100 milliGRAM(s) Oral daily  tamsulosin 0.4 milliGRAM(s) Oral at bedtime        acetaminophen     Tablet .. 975 milliGRAM(s) Oral every 8 hours PRN  melatonin 3 milliGRAM(s) Oral at bedtime  pregabalin 50 milliGRAM(s) Oral two times a day  primidone 50 milliGRAM(s) Oral daily  traMADol 25 milliGRAM(s) Oral every 6 hours PRN  traMADol 50 milliGRAM(s) Oral every 6 hours PRN  traZODone 50 milliGRAM(s) Oral at bedtime    bisacodyl Suppository 10 milliGRAM(s) Rectal daily PRN    atorvastatin 10 milliGRAM(s) Oral at bedtime  finasteride 5 milliGRAM(s) Oral daily    influenza   Vaccine 0.5 milliLiter(s) IntraMuscular once      FAMILY HISTORY:      Non-contributory    SOCIAL HISTORY:    [ ] Tobacco  [ ] Drugs  [ ] Alcohol    Allergies    No Known Allergies    Intolerances    	    REVIEW OF SYSTEMS:  CONSTITUTIONAL: No fever  EYES: No eye pain, visual disturbances, or discharge  ENMT:  No difficulty hearing, tinnitus  NECK: No pain or stiffness  RESPIRATORY: No cough, wheezing,  CARDIOVASCULAR: No chest pain, palpitations, passing out, dizziness, or leg swelling  GASTROINTESTINAL:  No nausea, vomiting, diarrhea or constipation. No melena.  GENITOURINARY: No dysuria, hematuria  NEUROLOGICAL: No stroke like symptoms  SKIN: No burning or lesions   ENDOCRINE: No heat or cold intolerance  MUSCULOSKELETAL: No joint pain or swelling  PSYCHIATRIC: No  anxiety, mood swings  HEME/LYMPH: No bleeding gums  ALLERGY AND IMMUNOLOGIC: No hives or eczema	    All other ROS negative    PHYSICAL EXAM:  T(C): 36.4 (10-29-21 @ 00:41), Max: 37.1 (10-28-21 @ 14:29)  HR: 86 (10-29-21 @ 00:41) (86 - 103)  BP: 116/54 (10-29-21 @ 00:41) (116/54 - 143/73)  RR: 18 (10-29-21 @ 00:41) (16 - 18)  SpO2: 94% (10-29-21 @ 00:41) (94% - 100%)  Wt(kg): --  I&O's Summary    27 Oct 2021 07:01  -  28 Oct 2021 07:00  --------------------------------------------------------  IN: 530 mL / OUT: 1840 mL / NET: -1310 mL    28 Oct 2021 07:01  -  29 Oct 2021 00:43  --------------------------------------------------------  IN: 1270 mL / OUT: 1900 mL / NET: -630 mL        Appearance: Normal	  HEENT:   Normal oral mucosa, EOMI	  Cardiovascular:  S1 S2, No JVD,    Respiratory: Lungs clear to auscultation	  Psychiatry: Alert  Gastrointestinal:  Soft, Non-tender, + BS	  Skin: No rashes   Neurologic: Non-focal  Extremities:  No edema  Vascular: Peripheral pulses palpable    	    	  	  CARDIAC MARKERS:  Labs personally reviewed by me                                  10.7   5.18  )-----------( 177      ( 28 Oct 2021 06:06 )             31.5     10-28    135  |  104  |  12  ----------------------------<  107<H>  4.3   |  20<L>  |  0.56    Ca    8.5      28 Oct 2021 06:06    TPro  6.0  /  Alb  3.7  /  TBili  0.4  /  DBili  x   /  AST  23  /  ALT  12  /  AlkPhos  50  10-27          EKG: Personally reviewed by me -   Radiology: Personally reviewed by me -       Assessment /Plan:       Differential diagnosis and plan of care discussed with patient after the evaluation. Counseling on diet, nutritional counseling, weight management, exercise and medication compliance was done.   Advanced care planning/advanced directives discussed with patient/family. DNR status including forceful chest compressions to attempt to restart the heart, ventilator support/artificial breathing, electric shock, artificial nutrition, health care proxy, Molst form all discussed with pt. Pt wishes to consider. More than fifteen minutes spent on discussing advanced directives.          Robert Robertson DO Located within [facility]  Cardiovascular Medicine  [address] [place] [address], [address]  Office [phone]  Cell [phone]

## 2021-10-28 NOTE — PROGRESS NOTE ADULT - SUBJECTIVE AND OBJECTIVE BOX
Date of Service   10-28-21 @ 13:33    Patient is a 87y old  Male who presents with a chief complaint of     INTERVAL HISTORY: pt feels ok     REVIEW OF SYSTEMS:   CONSTITUTIONAL: No weakness  EYES/ENT: No visual changes; No throat pain  Neck: No pain or stiffness  Respiratory: No cough, wheezing, No shortness of breath  CARDIOVASCULAR: no chest pain or palpitations  GASTROINTESTINAL: No abdominal pain, no nausea, vomiting or hematemesis  GENITOURINARY: No dysuria, frequency or hematuria  NEUROLOGICAL: No stroke like symptoms  SKIN: No rashes    	  MEDICATIONS:  amLODIPine   Tablet 10 milliGRAM(s) Oral daily  losartan 100 milliGRAM(s) Oral daily  tamsulosin 0.4 milliGRAM(s) Oral at bedtime        PHYSICAL EXAM:  T(C): 36.7 (10-28-21 @ 11:55), Max: 37 (10-28-21 @ 00:00)  HR: 91 (10-28-21 @ 11:55) (87 - 104)  BP: 143/73 (10-28-21 @ 11:55) (109/57 - 149/67)  RR: 18 (10-28-21 @ 11:55) (16 - 20)  SpO2: 96% (10-28-21 @ 11:55) (95% - 100%)  Wt(kg): --  I&O's Summary    27 Oct 2021 07:01  -  28 Oct 2021 07:00  --------------------------------------------------------  IN: 530 mL / OUT: 1840 mL / NET: -1310 mL    28 Oct 2021 07:01  -  28 Oct 2021 13:33  --------------------------------------------------------  IN: 500 mL / OUT: 500 mL / NET: 0 mL      Height (cm): 165.1 (10-27 @ 19:39)  Weight (kg): 77.1 (10-27 @ 19:39)  BMI (kg/m2): 28.3 (10-27 @ 19:39)  BSA (m2): 1.85 (10-27 @ 19:39)    Appearance: In no distress	  HEENT:    PERRL, EOMI	  Cardiovascular:  S1 S2, No JVD  Respiratory: Lungs clear to auscultation	  Gastrointestinal:  Soft, Non-tender, + BS	  Vascularature:  No edema of LE  Psychiatric: Appropriate affect   Neuro: no acute focal deficits                               10.7   5.18  )-----------( 177      ( 28 Oct 2021 06:06 )             31.5     10-28    135  |  104  |  12  ----------------------------<  107<H>  4.3   |  20<L>  |  0.56    Ca    8.5      28 Oct 2021 06:06    TPro  6.0  /  Alb  3.7  /  TBili  0.4  /  DBili  x   /  AST  23  /  ALT  12  /  AlkPhos  50  10-27        Labs personally reviewed      ASSESSMENT/PLAN: 	    Patient is a 86 Y/O Male with PMhx of HTN, HLD, BPH, DVT in the past (not on AC, completed), presenting with R ankle pain/deformity and inability to bear weight s/p mechanical fall. The grandson states that the patient was walking outside when he rolled his ankle and fell. Denies headstrike/LOC. Denies numbness/tingling in the feet/toes. No other bone or joint complaints. Patient is ambulatory with a cane at home. Patient lives at home with his wife. found to have ankle fracture.       Problem # 1. HTN   resume home meds for BP control amlodipine and losartan   monitor BP and titrate meds as needed     Problem # 2.  HLD   statin  DASH diet when oral intake    Problem # 3. R tirmalleolar ankle fracture s/p closed reduction and splinting  NWB, fall precautions   S/P OR, PT  tolerated procedure well   pain management   ortho following     Problem # 4.  DVT and Gi PPX lovenox SQ       Janet Gaines [facility]   Robert Robertson DO [facility]  Cardiovascular Medicine  [address], [address]  Office: [phone]  Cell: [phone]

## 2021-10-28 NOTE — OCCUPATIONAL THERAPY INITIAL EVALUATION ADULT - ADDITIONAL COMMENTS
CT ankle: Status post cast blunting of each right malleoli are fracture. Moderate soft tissue swelling with several foci of gas that tracks into the joint space. This may related to a penetrating type injury. R ankle:  Comminuted displaced fracture of the lateral malleolus with mild shortening and lateral/posterior displacement of the distal fracture fragment. Comminuted displaced fracture of the medial malleolus with lateral displacement of the dominant distal fracture fragment. Probable acute nondisplaced posterior malleolus fracture. Widening of the medial clear space, concerning for syndesmotic injury.

## 2021-10-28 NOTE — PROGRESS NOTE ADULT - SUBJECTIVE AND OBJECTIVE BOX
Name of Patient : LESLI YANG  MRN: [id number]  Date of visit: 10-28-21       Subjective: Patient seen and examined. No new events except as noted.   S/P OR   pain control  BP control       MEDICATIONS:  MEDICATIONS  (STANDING):  amLODIPine   Tablet 10 milliGRAM(s) Oral daily  atorvastatin 10 milliGRAM(s) Oral at bedtime  enoxaparin Injectable 40 milliGRAM(s) SubCutaneous every 24 hours  finasteride 5 milliGRAM(s) Oral daily  influenza   Vaccine 0.5 milliLiter(s) IntraMuscular once  losartan 100 milliGRAM(s) Oral daily  melatonin 3 milliGRAM(s) Oral at bedtime  pregabalin 50 milliGRAM(s) Oral two times a day  primidone 50 milliGRAM(s) Oral daily  tamsulosin 0.4 milliGRAM(s) Oral at bedtime  traZODone 50 milliGRAM(s) Oral at bedtime      PHYSICAL EXAM:  T(C): 36.9 (10-28-21 @ 20:51), Max: 37.1 (10-28-21 @ 14:29)  HR: 88 (10-28-21 @ 20:51) (88 - 104)  BP: 118/69 (10-28-21 @ 20:51) (114/62 - 149/67)  RR: 18 (10-28-21 @ 20:51) (16 - 20)  SpO2: 96% (10-28-21 @ 20:51) (95% - 100%)  Wt(kg): --  I&O's Summary    27 Oct 2021 07:01  -  28 Oct 2021 07:00  --------------------------------------------------------  IN: 530 mL / OUT: 1840 mL / NET: -1310 mL    28 Oct 2021 07:01  -  28 Oct 2021 21:52  --------------------------------------------------------  IN: 1220 mL / OUT: 1200 mL / NET: 20 mL          Appearance: Normal	  HEENT:  PERRLA   Lymphatic: No lymphadenopathy   Cardiovascular: Normal S1 S2, no JVD  Respiratory: normal effort , clear  Gastrointestinal:  Soft, Non-tender  Skin: No rashes,  warm to touch  Psychiatry:  Mood & affect appropriate  Musculuskeletal: LE cast       All labs, Imaging and EKGs personally reviewed       10-27-21 @ 07:01  -  10-28-21 @ 07:00  --------------------------------------------------------  IN: 530 mL / OUT: 1840 mL / NET: -1310 mL    10-28-21 @ 07:01  -  10-28-21 @ 21:52  --------------------------------------------------------  IN: 1220 mL / OUT: 1200 mL / NET: 20 mL                          10.7   5.18  )-----------( 177      ( 28 Oct 2021 06:06 )             31.5               10-    135  |  104  |  12  ----------------------------<  107<H>  4.3   |  20<L>  |  0.56    Ca    8.5      28 Oct 2021 06:06    TPro  6.0  /  Alb  3.7  /  TBili  0.4  /  DBili  x   /  AST  23  /  ALT  12  /  AlkPhos  50  10    PT/INR - ( 27 Oct 2021 14:47 )   PT: 12.6 sec;   INR: 1.05 ratio         PTT - ( 27 Oct 2021 14:47 )  PTT:28.0 sec                   Urinalysis Basic - ( 27 Oct 2021 14:16 )    Color: Yellow / Appearance: Clear / S.021 / pH: x  Gluc: x / Ketone: Negative  / Bili: Negative / Urobili: Negative   Blood: x / Protein: Trace / Nitrite: Negative   Leuk Esterase: Negative / RBC: x / WBC x   Sq Epi: x / Non Sq Epi: x / Bacteria: x

## 2021-10-29 ENCOUNTER — TRANSCRIPTION ENCOUNTER (OUTPATIENT)
Age: 86
End: 2021-10-29

## 2021-10-29 VITALS
TEMPERATURE: 98 F | DIASTOLIC BLOOD PRESSURE: 72 MMHG | RESPIRATION RATE: 18 BRPM | OXYGEN SATURATION: 97 % | HEART RATE: 86 BPM | SYSTOLIC BLOOD PRESSURE: 126 MMHG

## 2021-10-29 LAB
HCT VFR BLD CALC: 32.1 % — LOW (ref 39–50)
HGB BLD-MCNC: 10.8 G/DL — LOW (ref 13–17)
MCHC RBC-ENTMCNC: 31.9 PG — SIGNIFICANT CHANGE UP (ref 27–34)
MCHC RBC-ENTMCNC: 33.6 GM/DL — SIGNIFICANT CHANGE UP (ref 32–36)
MCV RBC AUTO: 94.7 FL — SIGNIFICANT CHANGE UP (ref 80–100)
NRBC # BLD: 0 /100 WBCS — SIGNIFICANT CHANGE UP (ref 0–0)
PLATELET # BLD AUTO: 184 K/UL — SIGNIFICANT CHANGE UP (ref 150–400)
RBC # BLD: 3.39 M/UL — LOW (ref 4.2–5.8)
RBC # FLD: 13.7 % — SIGNIFICANT CHANGE UP (ref 10.3–14.5)
WBC # BLD: 6.92 K/UL — SIGNIFICANT CHANGE UP (ref 3.8–10.5)
WBC # FLD AUTO: 6.92 K/UL — SIGNIFICANT CHANGE UP (ref 3.8–10.5)

## 2021-10-29 PROCEDURE — 85610 PROTHROMBIN TIME: CPT

## 2021-10-29 PROCEDURE — 73700 CT LOWER EXTREMITY W/O DYE: CPT | Mod: MA

## 2021-10-29 PROCEDURE — 73620 X-RAY EXAM OF FOOT: CPT

## 2021-10-29 PROCEDURE — C1769: CPT

## 2021-10-29 PROCEDURE — 73600 X-RAY EXAM OF ANKLE: CPT

## 2021-10-29 PROCEDURE — 99285 EMERGENCY DEPT VISIT HI MDM: CPT

## 2021-10-29 PROCEDURE — U0003: CPT

## 2021-10-29 PROCEDURE — 80048 BASIC METABOLIC PNL TOTAL CA: CPT

## 2021-10-29 PROCEDURE — 76000 FLUOROSCOPY <1 HR PHYS/QHP: CPT

## 2021-10-29 PROCEDURE — 86901 BLOOD TYPING SEROLOGIC RH(D): CPT

## 2021-10-29 PROCEDURE — 86900 BLOOD TYPING SEROLOGIC ABO: CPT

## 2021-10-29 PROCEDURE — U0005: CPT

## 2021-10-29 PROCEDURE — C1713: CPT

## 2021-10-29 PROCEDURE — 97112 NEUROMUSCULAR REEDUCATION: CPT

## 2021-10-29 PROCEDURE — 85027 COMPLETE CBC AUTOMATED: CPT

## 2021-10-29 PROCEDURE — 73590 X-RAY EXAM OF LOWER LEG: CPT

## 2021-10-29 PROCEDURE — 76377 3D RENDER W/INTRP POSTPROCES: CPT

## 2021-10-29 PROCEDURE — 36415 COLL VENOUS BLD VENIPUNCTURE: CPT

## 2021-10-29 PROCEDURE — 85730 THROMBOPLASTIN TIME PARTIAL: CPT

## 2021-10-29 PROCEDURE — 71045 X-RAY EXAM CHEST 1 VIEW: CPT

## 2021-10-29 PROCEDURE — 97161 PT EVAL LOW COMPLEX 20 MIN: CPT

## 2021-10-29 PROCEDURE — 97530 THERAPEUTIC ACTIVITIES: CPT

## 2021-10-29 PROCEDURE — 97166 OT EVAL MOD COMPLEX 45 MIN: CPT

## 2021-10-29 PROCEDURE — 86850 RBC ANTIBODY SCREEN: CPT

## 2021-10-29 PROCEDURE — 73610 X-RAY EXAM OF ANKLE: CPT

## 2021-10-29 PROCEDURE — 81003 URINALYSIS AUTO W/O SCOPE: CPT

## 2021-10-29 PROCEDURE — 96374 THER/PROPH/DIAG INJ IV PUSH: CPT

## 2021-10-29 PROCEDURE — 80053 COMPREHEN METABOLIC PANEL: CPT

## 2021-10-29 PROCEDURE — 96375 TX/PRO/DX INJ NEW DRUG ADDON: CPT

## 2021-10-29 RX ORDER — TRAZODONE HCL 50 MG
1 TABLET ORAL
Qty: 0 | Refills: 0 | DISCHARGE

## 2021-10-29 RX ORDER — AMLODIPINE BESYLATE 2.5 MG/1
1 TABLET ORAL
Qty: 0 | Refills: 0 | DISCHARGE

## 2021-10-29 RX ORDER — TAMSULOSIN HYDROCHLORIDE 0.4 MG/1
0 CAPSULE ORAL
Qty: 0 | Refills: 0 | DISCHARGE

## 2021-10-29 RX ORDER — TRAMADOL HYDROCHLORIDE 50 MG/1
0.5 TABLET ORAL
Qty: 0 | Refills: 0 | DISCHARGE
Start: 2021-10-29

## 2021-10-29 RX ORDER — TAMSULOSIN HYDROCHLORIDE 0.4 MG/1
1 CAPSULE ORAL
Qty: 0 | Refills: 0 | DISCHARGE
Start: 2021-10-29

## 2021-10-29 RX ORDER — ENOXAPARIN SODIUM 100 MG/ML
40 INJECTION SUBCUTANEOUS
Qty: 0 | Refills: 0 | DISCHARGE
Start: 2021-10-29

## 2021-10-29 RX ORDER — PRIMIDONE 250 MG/1
1 TABLET ORAL
Qty: 0 | Refills: 0 | DISCHARGE
Start: 2021-10-29

## 2021-10-29 RX ORDER — PRIMIDONE 250 MG/1
1 TABLET ORAL
Qty: 0 | Refills: 0 | DISCHARGE

## 2021-10-29 RX ORDER — LOSARTAN POTASSIUM 100 MG/1
1 TABLET, FILM COATED ORAL
Qty: 0 | Refills: 0 | DISCHARGE
Start: 2021-10-29

## 2021-10-29 RX ORDER — TRAMADOL HYDROCHLORIDE 50 MG/1
1 TABLET ORAL
Qty: 0 | Refills: 0 | DISCHARGE

## 2021-10-29 RX ORDER — TRAZODONE HCL 50 MG
1 TABLET ORAL
Qty: 0 | Refills: 0 | DISCHARGE
Start: 2021-10-29

## 2021-10-29 RX ORDER — LANOLIN ALCOHOL/MO/W.PET/CERES
1 CREAM (GRAM) TOPICAL
Qty: 0 | Refills: 0 | DISCHARGE
Start: 2021-10-29

## 2021-10-29 RX ORDER — ACETAMINOPHEN 500 MG
3 TABLET ORAL
Qty: 0 | Refills: 0 | DISCHARGE
Start: 2021-10-29

## 2021-10-29 RX ORDER — FINASTERIDE 5 MG/1
1 TABLET, FILM COATED ORAL
Qty: 0 | Refills: 0 | DISCHARGE
Start: 2021-10-29

## 2021-10-29 RX ORDER — ACETAMINOPHEN 500 MG
2 TABLET ORAL
Qty: 0 | Refills: 0 | DISCHARGE

## 2021-10-29 RX ORDER — ATORVASTATIN CALCIUM 80 MG/1
1 TABLET, FILM COATED ORAL
Qty: 0 | Refills: 0 | DISCHARGE

## 2021-10-29 RX ORDER — BACLOFEN 100 %
1 POWDER (GRAM) MISCELLANEOUS
Qty: 0 | Refills: 0 | DISCHARGE

## 2021-10-29 RX ORDER — MIRABEGRON 50 MG/1
1 TABLET, EXTENDED RELEASE ORAL
Qty: 0 | Refills: 0 | DISCHARGE

## 2021-10-29 RX ORDER — AMLODIPINE BESYLATE 2.5 MG/1
1 TABLET ORAL
Qty: 0 | Refills: 0 | DISCHARGE
Start: 2021-10-29

## 2021-10-29 RX ORDER — ATORVASTATIN CALCIUM 80 MG/1
1 TABLET, FILM COATED ORAL
Qty: 0 | Refills: 0 | DISCHARGE
Start: 2021-10-29

## 2021-10-29 RX ORDER — DICLOFENAC SODIUM 75 MG/1
1 TABLET, DELAYED RELEASE ORAL
Qty: 0 | Refills: 0 | DISCHARGE

## 2021-10-29 RX ORDER — TRAMADOL HYDROCHLORIDE 50 MG/1
1 TABLET ORAL
Qty: 0 | Refills: 0 | DISCHARGE
Start: 2021-10-29

## 2021-10-29 RX ORDER — DUTASTERIDE 0.5 MG/1
1 CAPSULE, LIQUID FILLED ORAL
Qty: 0 | Refills: 0 | DISCHARGE

## 2021-10-29 RX ADMIN — PRIMIDONE 50 MILLIGRAM(S): 250 TABLET ORAL at 11:36

## 2021-10-29 RX ADMIN — Medication 50 MILLIGRAM(S): at 05:30

## 2021-10-29 RX ADMIN — TAMSULOSIN HYDROCHLORIDE 0.4 MILLIGRAM(S): 0.4 CAPSULE ORAL at 21:28

## 2021-10-29 RX ADMIN — ENOXAPARIN SODIUM 40 MILLIGRAM(S): 100 INJECTION SUBCUTANEOUS at 07:51

## 2021-10-29 RX ADMIN — FINASTERIDE 5 MILLIGRAM(S): 5 TABLET, FILM COATED ORAL at 11:36

## 2021-10-29 RX ADMIN — Medication 975 MILLIGRAM(S): at 21:59

## 2021-10-29 RX ADMIN — Medication 975 MILLIGRAM(S): at 21:29

## 2021-10-29 RX ADMIN — Medication 50 MILLIGRAM(S): at 17:04

## 2021-10-29 RX ADMIN — ATORVASTATIN CALCIUM 10 MILLIGRAM(S): 80 TABLET, FILM COATED ORAL at 21:28

## 2021-10-29 RX ADMIN — Medication 3 MILLIGRAM(S): at 21:29

## 2021-10-29 RX ADMIN — AMLODIPINE BESYLATE 10 MILLIGRAM(S): 2.5 TABLET ORAL at 05:30

## 2021-10-29 RX ADMIN — LOSARTAN POTASSIUM 100 MILLIGRAM(S): 100 TABLET, FILM COATED ORAL at 05:30

## 2021-10-29 RX ADMIN — Medication 50 MILLIGRAM(S): at 21:28

## 2021-10-29 NOTE — DISCHARGE NOTE NURSING/CASE MANAGEMENT/SOCIAL WORK - PATIENT PORTAL LINK FT
You can access the FollowMyHealth Patient Portal offered by [facility] by registering at the following website: [email or web address]. By joining [facility]’s FollowMyHealth portal, you will also be able to view your health information using other applications (apps) compatible with our system.

## 2021-10-29 NOTE — DISCHARGE NOTE PROVIDER - NSDCCPCAREPLAN_GEN_ALL_CORE_FT
PRINCIPAL DISCHARGE DIAGNOSIS  Diagnosis: Bimalleolar fracture of right ankle  Assessment and Plan of Treatment: You sustained an injury to your ankle due to a fall that required surgical intervention. Please follow up with the Orthopedis neal rodriguez from  rehab      SECONDARY DISCHARGE DIAGNOSES  Diagnosis: Benign essential HTN  Assessment and Plan of Treatment: Low salt diet  Activity as tolerated.  Take all medication as prescribed.  Follow up with your medical doctor for routine blood pressure monitoring at your next visit.  Notify your doctor if you have any of the following symptoms:   Dizziness, Lightheadedness, Blurry vision, Headache, Chest pain, Shortness of breath      Diagnosis: Hyperlipidemia with low HDL  Assessment and Plan of Treatment: Continue with your cholesterol medications. Eat a heart healthy diet that is low in saturated fats and salt, and includes whole grains, fruits, vegetables and lean protein; exercise regularly (consult with your physician or cardiologist first); maintain a heart healthy weight; if you smoke - quit (A resource to help you stop smoking is the [facility] – phone number [phone].). Continue to follow with your primary physician or cardiologist.  Seek medical help for dizziness, Lightheadedness, Blurry vision, Headache, Chest pain, Shortness of breath

## 2021-10-29 NOTE — DISCHARGE NOTE PROVIDER - NSDCMRMEDTOKEN_GEN_ALL_CORE_FT
acetaminophen 325 mg oral tablet: 3 tab(s) orally every 8 hours, As needed, Mild Pain (1 - 3)  amLODIPine 10 mg oral tablet: 1 tab(s) orally once a day  Artificial Tears ophthalmic solution: 1 drop(s) to each affected eye 4 times a day  atorvastatin 10 mg oral tablet: 1 tab(s) orally once a day (at bedtime)  B-Complex 50 oral tablet: 1 tab(s) orally once a day  baclofen 10 mg oral tablet: 1 tab(s) orally 2 times a day, As Needed  Creon 36,000 units oral delayed release capsule: 1 cap(s) orally 1-4 times a day  Dexilant 60 mg oral delayed release capsule: 1 cap(s) orally once a day  diclofenac sodium 50 mg oral delayed release tablet: 1 tab(s) orally 2 times a day, As Needed  docusate sodium 100 mg oral capsule: 1 cap(s) orally 2 times a day  dutasteride 0.5 mg oral capsule: 1 cap(s) orally once a day  Linzess 145 mcg oral capsule: 1 cap(s) orally once a day  losartan 100 mg oral tablet: 1 tab(s) orally once a day  Lyrica 50 mg oral capsule: 1 cap(s) orally 2 times a day  magnesium oxide 400 mg oral tablet: 1 tab(s) orally once a day  Multiple Vitamins oral tablet: 1 tab(s) orally once a day  Myrbetriq 50 mg oral tablet, extended release: 1 tab(s) orally once a day  primidone 50 mg oral tablet: 1 tab(s) orally once a day  Restasis 0.05% ophthalmic emulsion: 1 drop(s) to each affected eye every 12 hours  senna oral tablet: 2 tab(s) orally once a day (at bedtime)  tamsulosin 0.4 mg oral capsule: 1-2 cap(s) orally once a day  traMADol 50 mg oral tablet: 1 tab(s) orally 2 times a day, As Needed  traMADol 50 mg oral tablet: 0.5 tab(s) orally every 6 hours, As needed, Moderate Pain (4 - 6)  traMADol 50 mg oral tablet: 1 tab(s) orally every 6 hours, As needed, Severe Pain (7 - 10)  traZODone 50 mg oral tablet: 1 tab(s) orally once a day (at bedtime)  Vascepa 1 g oral capsule: 2 cap(s) orally 2 times a day  Xanax 0.25 mg oral tablet: 1 tab(s) orally 2 times a day, As Needed   acetaminophen 325 mg oral tablet: 3 tab(s) orally every 8 hours, As needed, Mild Pain (1 - 3)  amLODIPine 10 mg oral tablet: 1 tab(s) orally once a day  atorvastatin 10 mg oral tablet: 1 tab(s) orally once a day (at bedtime)  B-Complex 50 oral tablet: 1 tab(s) orally once a day  bisacodyl 10 mg rectal suppository: 1 suppository(ies) rectal once a day, As needed, If no bowel movement  Creon 36,000 units oral delayed release capsule: 1 cap(s) orally 1-4 times a day  Dexilant 60 mg oral delayed release capsule: 1 cap(s) orally once a day  docusate sodium 100 mg oral capsule: 1 cap(s) orally 2 times a day  enoxaparin: to be given ppx for 30 days   finasteride 5 mg oral tablet: 1 tab(s) orally once a day  Linzess 145 mcg oral capsule: 1 cap(s) orally once a day  losartan 100 mg oral tablet: 1 tab(s) orally once a day  magnesium oxide 400 mg oral tablet: 1 tab(s) orally once a day  melatonin 3 mg oral tablet: 1 tab(s) orally once a day (at bedtime)  Multiple Vitamins oral tablet: 1 tab(s) orally once a day  pregabalin 50 mg oral capsule: 1 cap(s) orally 2 times a day  primidone 50 mg oral tablet: 1 tab(s) orally once a day  Restasis 0.05% ophthalmic emulsion: 1 drop(s) to each affected eye every 12 hours  senna oral tablet: 2 tab(s) orally once a day (at bedtime)  tamsulosin 0.4 mg oral capsule: 1 cap(s) orally once a day (at bedtime)  traMADol 50 mg oral tablet: 0.5 tab(s) orally every 6 hours, As needed, Moderate Pain (4 - 6)  traMADol 50 mg oral tablet: 1 tab(s) orally every 6 hours, As needed, Severe Pain (7 - 10)  traZODone 50 mg oral tablet: 1 tab(s) orally once a day (at bedtime)  Vascepa 1 g oral capsule: 2 cap(s) orally 2 times a day  Xanax 0.25 mg oral tablet: 1 tab(s) orally 2 times a day, As Needed   acetaminophen 325 mg oral tablet: 3 tab(s) orally every 8 hours, As needed, Mild Pain (1 - 3)  amLODIPine 10 mg oral tablet: 1 tab(s) orally once a day  atorvastatin 10 mg oral tablet: 1 tab(s) orally once a day (at bedtime)  B-Complex 50 oral tablet: 1 tab(s) orally once a day  bisacodyl 10 mg rectal suppository: 1 suppository(ies) rectal once a day, As needed, If no bowel movement  Creon 36,000 units oral delayed release capsule: 1 cap(s) orally 1-4 times a day  Dexilant 60 mg oral delayed release capsule: 1 cap(s) orally once a day  docusate sodium 100 mg oral capsule: 1 cap(s) orally 2 times a day  enoxaparin: 40 milligram(s) subcutaneous once a day for 30 days   finasteride 5 mg oral tablet: 1 tab(s) orally once a day  Linzess 145 mcg oral capsule: 1 cap(s) orally once a day  losartan 100 mg oral tablet: 1 tab(s) orally once a day  magnesium oxide 400 mg oral tablet: 1 tab(s) orally once a day  melatonin 3 mg oral tablet: 1 tab(s) orally once a day (at bedtime)  Multiple Vitamins oral tablet: 1 tab(s) orally once a day  pregabalin 50 mg oral capsule: 1 cap(s) orally 2 times a day  primidone 50 mg oral tablet: 1 tab(s) orally once a day  Restasis 0.05% ophthalmic emulsion: 1 drop(s) to each affected eye every 12 hours  senna oral tablet: 2 tab(s) orally once a day (at bedtime)  tamsulosin 0.4 mg oral capsule: 1 cap(s) orally once a day (at bedtime)  traMADol 50 mg oral tablet: 0.5 tab(s) orally every 6 hours, As needed, Moderate Pain (4 - 6)  traMADol 50 mg oral tablet: 1 tab(s) orally every 6 hours, As needed, Severe Pain (7 - 10)  traZODone 50 mg oral tablet: 1 tab(s) orally once a day (at bedtime)  Vascepa 1 g oral capsule: 2 cap(s) orally 2 times a day  Xanax 0.25 mg oral tablet: 1 tab(s) orally 2 times a day, As Needed

## 2021-10-29 NOTE — PROGRESS NOTE ADULT - ASSESSMENT
87M s/p POD#2 from right ankle ORIF    - pain control  - DVT ppx  - NWB RLE in [facility]  - PT/OT  - dispo planning - [place]
Patient is a 86 Y/O Male with PMhx of HTN, HLD, BPH, DVT in the past (not on AC, completed), presenting with R ankle pain/deformity and inability to bear weight s/p mechanical fall. The grandson states that the patient was walking outside when he rolled his ankle and fell. Denies headstrike/LOC. Denies numbness/tingling in the feet/toes. No other bone or joint complaints. Patient is ambulatory with a cane at home. Patient lives at home with his wife. found to have ankle fracture.     # R tirmalleolar ankle fracture s/p closed reduction and splinting  Ortho eval appreciated  pain control   NWB, fall precautions   S/P OR, PT  pain control   D/C planing to rehab    # HTN   resume home meds for BP control   monitor     # HLD   statin  DASH diet when oral intake    # BPH  Resume home meds        # Anxiety      DVT and Gi PPX 
Patient is a 88 Y/O Male with PMhx of HTN, HLD, BPH, DVT in the past (not on AC, completed), presenting with R ankle pain/deformity and inability to bear weight s/p mechanical fall. The grandson states that the patient was walking outside when he rolled his ankle and fell. Denies headstrike/LOC. Denies numbness/tingling in the feet/toes. No other bone or joint complaints. Patient is ambulatory with a cane at home. Patient lives at home with his wife. found to have ankle fracture.     # R tirmalleolar ankle fracture s/p closed reduction and splinting  Ortho eval appreciated  pain control   NWB, fall precautions   S/P OR, PT  painc ontrol       # HTN   resume home meds for BP control   monitor     # HLD   statin  DASH diet when oral intake    # BPH  Resume home meds        # Anxiety      DVT and Gi PPX

## 2021-10-29 NOTE — PROGRESS NOTE ADULT - SUBJECTIVE AND OBJECTIVE BOX
Orthopedic Surgery Progress Note     S: Patient seen and examined today. No acute events overnight. Pain is well controlled.    MEDICATIONS  (STANDING):  amLODIPine   Tablet 10 milliGRAM(s) Oral daily  atorvastatin 10 milliGRAM(s) Oral at bedtime  enoxaparin Injectable 40 milliGRAM(s) SubCutaneous every 24 hours  finasteride 5 milliGRAM(s) Oral daily  influenza   Vaccine 0.5 milliLiter(s) IntraMuscular once  losartan 100 milliGRAM(s) Oral daily  melatonin 3 milliGRAM(s) Oral at bedtime  pregabalin 50 milliGRAM(s) Oral two times a day  primidone 50 milliGRAM(s) Oral daily  tamsulosin 0.4 milliGRAM(s) Oral at bedtime  traZODone 50 milliGRAM(s) Oral at bedtime    MEDICATIONS  (PRN):  acetaminophen     Tablet .. 975 milliGRAM(s) Oral every 8 hours PRN Mild Pain (1 - 3)  bisacodyl Suppository 10 milliGRAM(s) Rectal daily PRN If no bowel movement  traMADol 25 milliGRAM(s) Oral every 6 hours PRN Moderate Pain (4 - 6)  traMADol 50 milliGRAM(s) Oral every 6 hours PRN Severe Pain (7 - 10)      Physical Exam:    Vital Signs Last 24 Hrs  T(C): 36.8 (29 Oct 2021 05:05), Max: 37.1 (28 Oct 2021 14:29)  T(F): 98.2 (29 Oct 2021 05:05), Max: 98.7 (28 Oct 2021 14:29)  HR: 85 (29 Oct 2021 05:05) (85 - 103)  BP: 133/78 (29 Oct 2021 05:05) (116/54 - 143/73)  BP(mean): 84 (28 Oct 2021 08:00) (84 - 88)  RR: 18 (29 Oct 2021 05:05) (16 - 18)  SpO2: 94% (29 Oct 2021 05:05) (94% - 100%)    10-27-21 @ 07:01  -  10-28-21 @ 07:00  --------------------------------------------------------  IN: 530 mL / OUT: 1840 mL / NET: -1310 mL    10-28-21 @ 07:01  -  10-29-21 @ 06:39  --------------------------------------------------------  IN: 1270 mL / OUT: 2800 mL / NET: -1530 mL          Gen: awake, alert, NAD  Resp: no increased work of breathing  RLE:  - wiggling toes  - sensation intact  - WWP  - Compartments soft  - No calf TTP           LABS:                        10.8   6.92  )-----------( 184      ( 29 Oct 2021 06:15 )             32.1     10-28    135  |  104  |  12  ----------------------------<  107<H>  4.3   |  20<L>  |  0.56    Ca    8.5      28 Oct 2021 06:06    TPro  6.0  /  Alb  3.7  /  TBili  0.4  /  DBili  x   /  AST  23  /  ALT  12  /  AlkPhos  50  10-27

## 2021-10-29 NOTE — PROGRESS NOTE ADULT - SUBJECTIVE AND OBJECTIVE BOX
Name of Patient : LESLI YANG  MRN: [id number]  Date of visit: 10-29-21 @ 10:04      Subjective: Patient seen and examined. No new events except as noted.   Doing okay  PT as tolerated     REVIEW OF SYSTEMS:    CONSTITUTIONAL: No weakness, fevers or chills  EYES/ENT: No visual changes;  No vertigo or throat pain   NECK: No pain or stiffness  RESPIRATORY: No cough, wheezing, hemoptysis; No shortness of breath  CARDIOVASCULAR: No chest pain or palpitations  GASTROINTESTINAL: No abdominal or epigastric pain. No nausea, vomiting, or hematemesis; No diarrhea or constipation. No melena or hematochezia.  GENITOURINARY: No dysuria, frequency or hematuria  NEUROLOGICAL: No numbness or weakness  SKIN: No itching, burning, rashes, or lesions   All other review of systems is negative unless indicated above.    MEDICATIONS:  MEDICATIONS  (STANDING):  amLODIPine   Tablet 10 milliGRAM(s) Oral daily  atorvastatin 10 milliGRAM(s) Oral at bedtime  enoxaparin Injectable 40 milliGRAM(s) SubCutaneous every 24 hours  finasteride 5 milliGRAM(s) Oral daily  influenza   Vaccine 0.5 milliLiter(s) IntraMuscular once  losartan 100 milliGRAM(s) Oral daily  melatonin 3 milliGRAM(s) Oral at bedtime  pregabalin 50 milliGRAM(s) Oral two times a day  primidone 50 milliGRAM(s) Oral daily  tamsulosin 0.4 milliGRAM(s) Oral at bedtime  traZODone 50 milliGRAM(s) Oral at bedtime      PHYSICAL EXAM:  T(C): 36.6 (10-29-21 @ 09:02), Max: 37.1 (10-28-21 @ 14:29)  HR: 91 (10-29-21 @ 09:02) (85 - 96)  BP: 123/75 (10-29-21 @ 09:02) (116/54 - 143/73)  RR: 18 (10-29-21 @ 09:02) (18 - 18)  SpO2: 96% (10-29-21 @ 09:02) (94% - 97%)  Wt(kg): --  I&O's Summary    28 Oct 2021 07:01  -  29 Oct 2021 07:00  --------------------------------------------------------  IN: 1270 mL / OUT: 2800 mL / NET: -1530 mL    29 Oct 2021 07:01  -  29 Oct 2021 10:04  --------------------------------------------------------  IN: 480 mL / OUT: 550 mL / NET: -70 mL          Appearance: Normal	  HEENT:  PERRLA   Lymphatic: No lymphadenopathy   Cardiovascular: Normal S1 S2, no JVD  Respiratory: normal effort , clear  Gastrointestinal:  Soft, Non-tender  Skin: No rashes,  warm to touch  Psychiatry:  Mood & affect appropriate  Musculuskeletal: LE cast       All labs, Imaging and EKGs personally reviewed     10-28-21 @ 07:01  -  10-29-21 @ 07:00  --------------------------------------------------------  IN: 1270 mL / OUT: 2800 mL / NET: -1530 mL    10-29-21 @ 07:01  -  10-29-21 @ 10:04  --------------------------------------------------------  IN: 480 mL / OUT: 550 mL / NET: -70 mL                          10.8   6.92  )-----------( 184      ( 29 Oct 2021 06:15 )             32.1               10-28    135  |  104  |  12  ----------------------------<  107<H>  4.3   |  20<L>  |  0.56    Ca    8.5      28 Oct 2021 06:06    TPro  6.0  /  Alb  3.7  /  TBili  0.4  /  DBili  x   /  AST  23  /  ALT  12  /  AlkPhos  50  10-27    PT/INR - ( 27 Oct 2021 14:47 )   PT: 12.6 sec;   INR: 1.05 ratio         PTT - ( 27 Oct 2021 14:47 )  PTT:28.0 sec                   Urinalysis Basic - ( 27 Oct 2021 14:16 )    Color: Yellow / Appearance: Clear / S.021 / pH: x  Gluc: x / Ketone: Negative  / Bili: Negative / Urobili: Negative   Blood: x / Protein: Trace / Nitrite: Negative   Leuk Esterase: Negative / RBC: x / WBC x   Sq Epi: x / Non Sq Epi: x / Bacteria: x

## 2021-10-29 NOTE — DISCHARGE NOTE PROVIDER - HOSPITAL COURSE
Patient is a 88 Y/O Male with PMhx of HTN, HLD, BPH, DVT in the past (not on AC, completed), presenting with R ankle pain/deformity and inability to bear weight s/p mechanical fall. The grandson states that the patient was walking outside when he rolled his ankle and fell. Denies headstrike /LOC. Denies numbness/tingling in the feet/toes. No other bone or joint complaints. Patient is ambulatory with a cane at home. Patient lives at home with his wife. found to have ankle fracture.     # R bimalleolar ankle fracture s/p closed reduction and splinting  Ortho eval appreciated  pain control   NWB, fall precautions   S/P OR, PT  pain control       # HTN   resume home meds for BP control   monitor     # HLD   statin  DASH diet when oral intake    # BPH  Resume home meds        # Anxiety    In discussion with Dr. Bashir, pt medically stable for discharge to [facility], and to follow up with Ortho and PCP.        Patient is a 86 Y/O Male with PMhx of HTN, HLD, BPH, DVT in the past (not on AC, completed), presenting with R ankle pain/deformity and inability to bear weight s/p mechanical fall. The grandson states that the patient was walking outside when he rolled his ankle and fell. Denies headstrike /LOC. Denies numbness/tingling in the feet/toes. No other bone or joint complaints. Patient is ambulatory with a cane at home. Patient lives at home with his wife. found to have ankle fracture.     # R bimalleolar ankle fracture s/p closed reduction and splinting  Ortho eval appreciated  pain control   NWB, fall precautions   S/P OR, PT  pain control       # HTN   resume home meds for BP control   monitor     # HLD   statin  DASH diet when oral intake    # BPH  Resume home meds        # Anxiety- xanax as needed     In discussion with Dr. Bashir, pt medically stable for discharge to [facility], and to follow up with Ortho and PCP.

## 2021-10-29 NOTE — DISCHARGE NOTE PROVIDER - CARE PROVIDER_API CALL
Jacek Pemberton)  Orthopaedic Surgery  [address]  [place] [address]  Phone: [phone]  Fax: [phone]  Follow Up Time:

## 2021-10-29 NOTE — PROGRESS NOTE ADULT - SUBJECTIVE AND OBJECTIVE BOX
Date of Service   10-29-21 @ 14:33    Patient is a 87y old  Male who presents with a chief complaint of Periop risk stratification (28 Oct 2021 11:42)      INTERVAL HISTORY:     TELEMETRY Personally reviewed:    REVIEW OF SYSTEMS:   CONSTITUTIONAL: No weakness  EYES/ENT: No visual changes; No throat pain  Neck: No pain or stiffness  Respiratory: No cough, wheezing, No shortness of breath  CARDIOVASCULAR: no chest pain or palpitations  GASTROINTESTINAL: No abdominal pain, no nausea, vomiting or hematemesis  GENITOURINARY: No dysuria, frequency or hematuria  NEUROLOGICAL: No stroke like symptoms  SKIN: No rashes    	  MEDICATIONS:  amLODIPine   Tablet 10 milliGRAM(s) Oral daily  losartan 100 milliGRAM(s) Oral daily  tamsulosin 0.4 milliGRAM(s) Oral at bedtime        PHYSICAL EXAM:  T(C): 36.6 (10-29-21 @ 09:02), Max: 37.1 (10-28-21 @ 16:42)  HR: 91 (10-29-21 @ 09:02) (85 - 92)  BP: 123/75 (10-29-21 @ 09:02) (116/54 - 133/78)  RR: 18 (10-29-21 @ 09:02) (18 - 18)  SpO2: 96% (10-29-21 @ 09:02) (94% - 97%)  Wt(kg): --  I&O's Summary    28 Oct 2021 07:01  -  29 Oct 2021 07:00  --------------------------------------------------------  IN: 1270 mL / OUT: 2800 mL / NET: -1530 mL    29 Oct 2021 07:01  -  29 Oct 2021 14:33  --------------------------------------------------------  IN: 480 mL / OUT: 550 mL / NET: -70 mL          Appearance: In no distress	  HEENT:    PERRL, EOMI	  Cardiovascular:  S1 S2, No JVD  Respiratory: faint crackles  Gastrointestinal:  Soft, Non-tender, + BS	  Vascularature:  No edema of LE  Psychiatric: Appropriate affect   Neuro: no acute focal deficits                               10.8   6.92  )-----------( 184      ( 29 Oct 2021 06:15 )             32.1     10-28    135  |  104  |  12  ----------------------------<  107<H>  4.3   |  20<L>  |  0.56    Ca    8.5      28 Oct 2021 06:06          Labs personally reviewed      ASSESSMENT/PLAN: 	  Patient is a 86 Y/O Male with PMhx of HTN, HLD, BPH, DVT in the past (not on AC, completed), presenting with R ankle pain/deformity and inability to bear weight s/p mechanical fall. The grandson states that the patient was walking outside when he rolled his ankle and fell. Denies headstrike/LOC. Denies numbness/tingling in the feet/toes. No other bone or joint complaints. Patient is ambulatory with a cane at home. Patient lives at home with his wife. found to have ankle fracture.       Problem # 1. HTN   c/w home meds for BP amlodipine and losartan   monitor BP and titrate meds as needed   BP stable     Problem # 2.  HLD   statin  DASH diet when oral intake    Problem # 3. R tirmalleolar ankle fracture s/p closed reduction and splinting  NWB, fall precautions   S/P OR, PT  tolerated procedure well   pain management   ortho following     Problem # 4.  DVT PPX lovenox SQ           Janet Gaines [facility]   Robert Robertson DO [facility]  Cardiovascular Medicine  [address] [place] [address], [address]  Office: [phone]  Cell: [phone] Date of Service   10-29-21 @ 14:33    Patient is a 87y old  Male who presents with a chief complaint of Periop risk stratification (28 Oct 2021 11:42)      INTERVAL HISTORY: feelsok       REVIEW OF SYSTEMS:   CONSTITUTIONAL: No weakness  EYES/ENT: No visual changes; No throat pain  Neck: No pain or stiffness  Respiratory: No cough, wheezing, No shortness of breath  CARDIOVASCULAR: no chest pain or palpitations  GASTROINTESTINAL: No abdominal pain, no nausea, vomiting or hematemesis  GENITOURINARY: No dysuria, frequency or hematuria  NEUROLOGICAL: No stroke like symptoms  SKIN: No rashes    	  MEDICATIONS:  amLODIPine   Tablet 10 milliGRAM(s) Oral daily  losartan 100 milliGRAM(s) Oral daily  tamsulosin 0.4 milliGRAM(s) Oral at bedtime        PHYSICAL EXAM:  T(C): 36.6 (10-29-21 @ 09:02), Max: 37.1 (10-28-21 @ 16:42)  HR: 91 (10-29-21 @ 09:02) (85 - 92)  BP: 123/75 (10-29-21 @ 09:02) (116/54 - 133/78)  RR: 18 (10-29-21 @ 09:02) (18 - 18)  SpO2: 96% (10-29-21 @ 09:02) (94% - 97%)  Wt(kg): --  I&O's Summary    28 Oct 2021 07:01  -  29 Oct 2021 07:00  --------------------------------------------------------  IN: 1270 mL / OUT: 2800 mL / NET: -1530 mL    29 Oct 2021 07:01  -  29 Oct 2021 14:33  --------------------------------------------------------  IN: 480 mL / OUT: 550 mL / NET: -70 mL          Appearance: In no distress	  HEENT:    PERRL, EOMI	  Cardiovascular:  S1 S2, No JVD  Respiratory: faint crackles  Gastrointestinal:  Soft, Non-tender, + BS	  Vascularature:  No edema of LE  Psychiatric: Appropriate affect   Neuro: no acute focal deficits                               10.8   6.92  )-----------( 184      ( 29 Oct 2021 06:15 )             32.1     10-28    135  |  104  |  12  ----------------------------<  107<H>  4.3   |  20<L>  |  0.56    Ca    8.5      28 Oct 2021 06:06          Labs personally reviewed      ASSESSMENT/PLAN: 	  Patient is a 88 Y/O Male with PMhx of HTN, HLD, BPH, DVT in the past (not on AC, completed), presenting with R ankle pain/deformity and inability to bear weight s/p mechanical fall. The grandson states that the patient was walking outside when he rolled his ankle and fell. Denies headstrike/LOC. Denies numbness/tingling in the feet/toes. No other bone or joint complaints. Patient is ambulatory with a cane at home. Patient lives at home with his wife. found to have ankle fracture.       Problem # 1. HTN   c/w home meds for BP amlodipine and losartan   monitor BP and titrate meds as needed   BP stable     Problem # 2.  HLD   statin  DASH diet when oral intake    Problem # 3. R tirmalleolar ankle fracture s/p closed reduction and splinting  NWB, fall precautions   S/P OR, PT  tolerated procedure well   pain management   ortho following     Problem # 4.  DVT PPX lovenox SQ           Janet Gaines [facility]   Robert Robertson DO [facility]  Cardiovascular Medicine  [address] [place] [address], [address]  Office: [phone]  Cell: [phone]

## 2021-11-15 ENCOUNTER — APPOINTMENT (OUTPATIENT)
Dept: ORTHOPEDIC SURGERY | Facility: CLINIC | Age: 86
End: 2021-11-15
Payer: MEDICARE

## 2021-11-15 VITALS — BODY MASS INDEX: 23.32 KG/M2 | WEIGHT: 140 LBS | HEIGHT: 65 IN

## 2021-11-15 PROCEDURE — 29405 APPL SHORT LEG CAST: CPT | Mod: 58,RT

## 2021-11-15 PROCEDURE — 99024 POSTOP FOLLOW-UP VISIT: CPT

## 2021-11-15 PROCEDURE — 73610 X-RAY EXAM OF ANKLE: CPT | Mod: RT

## 2021-11-15 RX ORDER — DUTASTERIDE 0.5 MG/1
0.5 CAPSULE, LIQUID FILLED ORAL
Qty: 30 | Refills: 0 | Status: ACTIVE | COMMUNITY
Start: 2021-10-26

## 2021-11-15 RX ORDER — CLOTRIMAZOLE 10 MG/ML
1 SOLUTION TOPICAL
Qty: 30 | Refills: 0 | Status: ACTIVE | COMMUNITY
Start: 2021-10-28

## 2021-11-15 RX ORDER — ATORVASTATIN CALCIUM 10 MG/1
10 TABLET, FILM COATED ORAL
Qty: 30 | Refills: 0 | Status: ACTIVE | COMMUNITY
Start: 2021-10-26

## 2021-11-15 RX ORDER — ICOSAPENT ETHYL 1000 MG/1
1 CAPSULE ORAL
Qty: 120 | Refills: 0 | Status: ACTIVE | COMMUNITY
Start: 2021-10-26

## 2021-11-15 RX ORDER — PANCRELIPASE 36000; 180000; 114000 [USP'U]/1; [USP'U]/1; [USP'U]/1
36000-114000 CAPSULE, DELAYED RELEASE PELLETS ORAL
Qty: 100 | Refills: 0 | Status: ACTIVE | COMMUNITY
Start: 2021-09-29

## 2021-11-15 RX ORDER — TRAZODONE HYDROCHLORIDE 50 MG/1
50 TABLET ORAL
Qty: 30 | Refills: 0 | Status: ACTIVE | COMMUNITY
Start: 2021-10-26

## 2021-11-15 RX ORDER — TAMSULOSIN HYDROCHLORIDE 0.4 MG/1
0.4 CAPSULE ORAL
Qty: 60 | Refills: 0 | Status: ACTIVE | COMMUNITY
Start: 2021-10-26

## 2021-11-15 RX ORDER — LOSARTAN POTASSIUM 100 MG/1
100 TABLET, FILM COATED ORAL
Qty: 30 | Refills: 0 | Status: ACTIVE | COMMUNITY
Start: 2021-10-26

## 2021-11-15 RX ORDER — LINACLOTIDE 145 UG/1
145 CAPSULE, GELATIN COATED ORAL
Qty: 30 | Refills: 0 | Status: ACTIVE | COMMUNITY
Start: 2021-10-26

## 2021-11-15 RX ORDER — ALPRAZOLAM 0.25 MG/1
0.25 TABLET ORAL
Qty: 60 | Refills: 0 | Status: ACTIVE | COMMUNITY
Start: 2021-10-27

## 2021-11-15 RX ORDER — CELECOXIB 200 MG/1
200 CAPSULE ORAL
Qty: 30 | Refills: 0 | Status: ACTIVE | COMMUNITY
Start: 2021-07-12

## 2021-11-15 RX ORDER — BACLOFEN 10 MG/1
10 TABLET ORAL
Qty: 60 | Refills: 0 | Status: ACTIVE | COMMUNITY
Start: 2021-10-26

## 2021-11-15 RX ORDER — AMLODIPINE BESYLATE 10 MG/1
10 TABLET ORAL
Qty: 30 | Refills: 0 | Status: ACTIVE | COMMUNITY
Start: 2021-10-26

## 2021-11-15 RX ORDER — DEXLANSOPRAZOLE 60 MG/1
60 CAPSULE, DELAYED RELEASE ORAL
Qty: 30 | Refills: 0 | Status: ACTIVE | COMMUNITY
Start: 2021-10-26

## 2021-11-15 RX ORDER — PRIMIDONE 50 MG/1
50 TABLET ORAL
Qty: 30 | Refills: 0 | Status: ACTIVE | COMMUNITY
Start: 2021-10-26

## 2021-11-15 RX ORDER — MIRABEGRON 50 MG/1
50 TABLET, FILM COATED, EXTENDED RELEASE ORAL
Qty: 30 | Refills: 0 | Status: ACTIVE | COMMUNITY
Start: 2021-10-26

## 2021-11-15 RX ORDER — CICLOPIROX OLAMINE 7.7 MG/G
0.77 CREAM TOPICAL
Qty: 90 | Refills: 0 | Status: ACTIVE | COMMUNITY
Start: 2021-06-28

## 2021-11-15 RX ORDER — TRAMADOL HYDROCHLORIDE 50 MG/1
50 TABLET, COATED ORAL
Qty: 60 | Refills: 0 | Status: ACTIVE | COMMUNITY
Start: 2021-10-26

## 2021-11-15 RX ORDER — HYDROCORTISONE 1 %
12 CREAM (GRAM) TOPICAL
Qty: 400 | Refills: 0 | Status: ACTIVE | COMMUNITY
Start: 2021-08-28

## 2021-11-15 RX ORDER — CHLORHEXIDINE GLUCONATE, 0.12% ORAL RINSE 1.2 MG/ML
0.12 SOLUTION DENTAL
Qty: 473 | Refills: 0 | Status: ACTIVE | COMMUNITY
Start: 2021-10-26

## 2021-11-15 RX ORDER — DICLOFENAC SODIUM 50 MG/1
50 TABLET, DELAYED RELEASE ORAL
Qty: 60 | Refills: 0 | Status: ACTIVE | COMMUNITY
Start: 2021-10-26

## 2021-11-15 RX ORDER — PREGABALIN 50 MG/1
50 CAPSULE ORAL
Qty: 60 | Refills: 0 | Status: ACTIVE | COMMUNITY
Start: 2021-10-26

## 2021-11-15 NOTE — END OF VISIT
[FreeTextEntry3] : All medical record entries made by the Scribe were at my,  Dr. Jacek Pemberton MD., direction and personally dictated by me on 11/15/2021. I have personally reviewed the chart and agree that the record accurately reflects my personal performance of the history, physical exam, assessment and plan.

## 2021-11-15 NOTE — HISTORY OF PRESENT ILLNESS
[de-identified] : s/p Open reduction internal fixation right ankle and application of short leg cast. [de-identified] : The patient is a 87 year male who returns for the 1st postoperative visit after undergoing Open reduction internal fixation right ankle and application of short leg cast  at [facility]. The surgery was on 10/27/2021. The patient is recovering at home. He reports postoperative pain. He presents today for cast change and check of the incision sites.  He takes Tylenol for pain. He is not taking baby aspirin prophylactically. He has not been putting any weight through the foot and ankle. [de-identified] : Patient is WDWN, alert, and in no acute distress. Breathing is unlabored. He is grossly oriented to person, place, and time.\par \par He is accompanied by his son and grandson today.\par \par The short leg cast was removed in order to check the incision sites. \par Dissolvable sutures in place. \par Incision sites are healing well, without signs of postoperative edema.\par Normal amount of postoperative edema and tenderness are present.  [de-identified] : AP, lateral and oblique views of the right ankle  were obtained today and revealed a trimalleolar fracture stabilized by two cannulated screws in the medial malleolus and one cannulated screw and one cortical screw in the lateral malleolus.  [de-identified] : Cast was removed to check the incision site. Sutures are dissolvable.\par A new right short leg cast was placed on 11/15/21. He and his family members were advised the cast will be in place for another 4 weeks. \par Proper cast care instructions were reviewed with the patient and his family members.\par He was advised to perform leg lift exercises and ankle pumps.\par He will remain as NWB while the cast is adorned for the next 4 weeks. \par He was strongly advised to take 81mg baby aspirin prophylactically. \par He will follow up in 4 weeks for cast removal and repeat xrays.

## 2021-11-15 NOTE — ADDENDUM
[FreeTextEntry1] : I, Amelia Wheatley wrote this note acting as a scribe for Dr. Jacek Pemberton on Nov 15, 2021.

## 2021-12-06 ENCOUNTER — APPOINTMENT (OUTPATIENT)
Dept: ORTHOPEDIC SURGERY | Facility: CLINIC | Age: 86
End: 2021-12-06
Payer: MEDICARE

## 2021-12-06 VITALS — HEIGHT: 65 IN | BODY MASS INDEX: 23.32 KG/M2 | WEIGHT: 140 LBS

## 2021-12-06 DIAGNOSIS — S82.851D DISPLACED TRIMALLEOLAR FRACTURE OF RIGHT LOWER LEG, SUBSEQUENT ENCOUNTER FOR CLOSED FRACTURE WITH ROUTINE HEALING: ICD-10-CM

## 2021-12-06 PROCEDURE — 99024 POSTOP FOLLOW-UP VISIT: CPT

## 2021-12-06 PROCEDURE — 73610 X-RAY EXAM OF ANKLE: CPT | Mod: RT

## 2021-12-07 NOTE — END OF VISIT
[FreeTextEntry3] : All medical record entries made by the Scribe were at my,  Dr. Jacek Pemberton MD., direction and personally dictated by me on 12/06/2021. I have personally reviewed the chart and agree that the record accurately reflects my personal performance of the history, physical exam, assessment and plan.

## 2021-12-07 NOTE — HISTORY OF PRESENT ILLNESS
[de-identified] : s/p Open reduction internal fixation right ankle and application of short leg cast. [de-identified] : The patient is a 87 year male who returns for the 2nd postoperative visit after undergoing Open reduction internal fixation right ankle and application of short leg cast  at [facility]. The surgery was on 10/27/2021. He returns on 12/6/21 for cast removal and repeat xrays.  [de-identified] : Patient is WDWN, alert, and in no acute distress. Breathing is unlabored. He is grossly oriented to person, place, and time.\par \par He is accompanied by his son and grandson today.\par \par Short leg cast removed on 12/6/21.\par Incision sites are well healed, without signs of postoperative edema.\par Normal amount of postoperative edema and tenderness are present\par Limitations of motion about the ankle due to casting for the last 6 weeks.   [de-identified] : AP, lateral and oblique views of the right ankle  were obtained today and revealed a trimalleolar fracture stabilized by two cannulated screws in the medial malleolus and one cannulated screw and one cortical screw in the lateral malleolus.  [de-identified] : The right short leg cast was removed on 12/6/21.\par He was transitioned into an air cast brace for support and advised to keep this on at all time when ambulating.\par He was encouraged on low impact activity such as walking.\par I am not recommending he attend physical therapy at this time but did give his grandson the option. They deferred. \par Follow up in 6 weeks for repeat xrays.

## 2021-12-07 NOTE — ADDENDUM
[FreeTextEntry1] : I, Amelia Wheatley wrote this note acting as a scribe for Dr. Jacek Pemberton on Dec 06, 2021.

## 2022-01-20 ENCOUNTER — APPOINTMENT (OUTPATIENT)
Dept: ORTHOPEDIC SURGERY | Facility: CLINIC | Age: 87
End: 2022-01-20

## 2022-06-17 NOTE — ED PROVIDER NOTE - RATE
Assumed care of pt at this alert and active on stretcher, playful and smiling, points to upper abdomen hurting x 2 days, mom states crusty eyes since yesterday  
Pt did not understand trevino becker face  Pain scale  
Specimens obtained and taken to lab  
Started to room to give pts meds was told by provider that pt left  
88

## 2022-10-20 NOTE — PHYSICAL THERAPY INITIAL EVALUATION ADULT - BED MOBILITY TRAINING, PT EVAL
[facility] Update Call    10/20/2022    Patient: Willis Arizmendi Patient : 1931   MRN: [id number]  Reason for Admission:  pleural effusion, s/p closed left hip fracture- ORIF Rosa Tao). Acute hypoxic respiratory failure secondary to heart failure or pneumonia, new CHF   Discharge Date: 10/8/22 RARS: Readmission Risk Score: 18.2      Voicemail left with Daryn Cronin with social work at [facility] requesting call back at her convenience. Will attempt back later    Call back from Daryn Cronin. Not much to report. Has cardiology appt 10/26 for new onset CHF diagnosis in hospital      Care Transitions Post Acute Facility Update    Care Transitions Interventions  Post Acute Facility: [facility] Update  Reported Nursing Issues: Patient going to card appt 10/26 then will DC to memory care  unit 10/27 with raymundo (most likely). No further updates.  She is doing well   ADLs: Maximum Assistance   Bed Mobility: Maximum Assistance   Transfer Assistance: Maximum Assistance   Ambulation Assistance: Dependent   Anticipated date for discharge: 10/27/22    Anticipated discharge services: DC to [facility] memory care LTC unit Admission Medication History status for the 11/29/2019 admission is complete. See EPIC admission navigator for Prior to Admission medications.    Interview Source(s): Patient's Mom      Reliability of Source: Good    Medication Adherence:  Good    Preferred Outpatient Pharmacy: [facility] pharmacy, [address],     Changes made to PTA medication list (reason)  Added:  - Acetaminophen ( TYLENOL ) 500 mg tablet   Deleted:   - amoxicillin ( AMOXIL) 500 mg capsule. Take 1 capsule ( 500 mg ) by mouth 2 times daily,    Changed:   - None   Additional medication history information:   -none     Prior to Admission Medication List   Prior to Admission medications    Medication Sig Last Dose Taking? Auth Provider   acetaminophen (TYLENOL) 500 MG tablet Take 500 mg by mouth every 6 hours as needed for mild pain Past Week Yes Unknown, Entered By History   amLODIPine (NORVASC) 2.5 MG tablet Take 1 tablet (2.5 mg) by mouth daily 11/28/2019 at PM  Yes Tyra Rosa MD   mycophenolate (GENERIC EQUIVALENT) 250 MG capsule Take 3 capsules (750 mg) by mouth 2 times daily 11/29/2019 at Am  Yes Tyra Rosa MD   sulfamethoxazole-trimethoprim (BACTRIM/SEPTRA) 400-80 MG tablet Take 1 tablet by mouth daily 11/28/2019 at Pm  Yes Tyra Rosa MD   tacrolimus (GENERIC EQUIVALENT) 0.5 MG capsule Take one cap (0.5 mg) twice daily (Total dose 1.5 mg in AM and 1.5 mg in PM) 11/29/2019 at Am  Yes Tyra Rosa MD   tacrolimus (GENERIC EQUIVALENT) 1 MG capsule Take one cap (1.0 mg) twice daily (Total dose 1.5 mg in AM and 1.5 mg in PM) 11/29/2019 at Am  Yes Tyra Rosa MD   polyethylene glycol (MIRALAX/GLYCOLAX) powder Take 17 g (1 capful) by mouth 2 times daily Titrate to soft daily bowel movement.   Daylin Okeefe APRN CNP     Time spent: 45 minutes    Medication history completed by:  TIGIST Dolan 2 (Pharmacy Intern)    GOAL: Pt will be independent with bed mobility in 2wks.

## 2023-04-11 NOTE — ED ADULT NURSE NOTE - NSFALLRSKPASTHIST_ED_ALL_ED
Initial SW/CM Assessment/Plan of Care Note     Baseline Assessment  89 year old admitted 4/10/2023 as Inpatient with a diagnosis of SOB/ leg swelling.   Prior to admission patient was living with Adult children, Other (comments) and residing at House    .  Patient does  have a Power of  for Healthcare.  Document is not activated.  Agent is Karol Medina.  Patient’s Primary Care Provider is Marzenna M Schoeneich, MD.     POA document lists Gabe Traore as decision maker #1; spouse . Therefore, daughter, Karol, placed as decision maker #1 in Epic.     Progress Note  Met with pt introduced self and role of CHF . Daughter and son-in-law at bedside.    Pt previously resided alone noted that recently they have had someone living with pt full time, Vanessa, to offer support. Family says that they are able to assist pt as needed as well. Family provides transportation. Denied concerns r/t medication cost, noted pt has active Medicaid.    Discussed cardiology providers recommendation to follow up in 2 weeks. Family noted they will provide transportation to appt. Scheduled for OV  with Ekta, added to AVS.    Plan  Patient/Family Discharge Goal: Home   Is patient/family goal achievable: Yes    SW/CM - Recommendations for Discharge: Home     Barriers to Discharge  Identified Barriers to Discharge/Transition Planning:          Anticipate patient will not need post-hospital services. Necessary services are available.  Anticipate patient can return to the environment from which patient entered the hospital.   Anticipate patient can provide self-care at discharge.    Refer to SW/CM Flowsheet for objective data.     Medical History  Past Medical History:   Diagnosis Date   • Basal cell cancer     face   • Cataract    • Chronic rhinitis    • Diverticulitis    • DJD (degenerative joint disease)     minor   • Generalized anxiety disorder    • Hyperlipidemia    • Hypertension    • Hypomagnesemia  5/31/2016   • Hypothyroidism    • Melanoma (CMD)     left thigh   • Osteoporosis 09/16/2010   • Pseudophakia of both eyes    • Rosacea    • Urinary incontinence        Prior to Admission Status  Functional Status  Ambulation: Assist of 1  Bathing: Personal care worker  Dressing: Personal care worker  Toileting: Personal care worker  Meal Preparation: Personal care worker  Medication Preparation: Personal care worker  Housekeeping: Personal care worker  Laundry: Personal care worker  Transportation: Family    Agency/Support  Type of Services Prior to Hospitalization: None  Support Systems: Children, Family members   Home Devices/Equipment: Blood pressure monitor  Mobility Assist Devices: None  Sensory Support Devices: Eyeglasses      Current Status  Current Mental Status: Alert, Oriented to Person, Oriented to Place, Oriented to Reason for Hospitalization, Oriented to Time    Insurance  Primary: [facility] [employer] [facility]  Secondary: N/A    Disposition Recommendations:  SW/CM recommendation for discharge: Home          yes

## 2023-05-07 ENCOUNTER — INPATIENT (INPATIENT)
Facility: HOSPITAL | Age: 88
LOS: 4 days | Discharge: NURSING, REHAB OR LONG-TERM CARE | DRG: 552 | End: 2023-05-12
Attending: INTERNAL MEDICINE | Admitting: INTERNAL MEDICINE
Payer: MEDICARE

## 2023-05-07 VITALS
OXYGEN SATURATION: 94 % | TEMPERATURE: 99 F | HEIGHT: 67 IN | DIASTOLIC BLOOD PRESSURE: 55 MMHG | HEART RATE: 75 BPM | WEIGHT: 179.9 LBS | SYSTOLIC BLOOD PRESSURE: 111 MMHG | RESPIRATION RATE: 16 BRPM

## 2023-05-07 DIAGNOSIS — N40.0 BENIGN PROSTATIC HYPERPLASIA WITHOUT LOWER URINARY TRACT SYMPTOMS: ICD-10-CM

## 2023-05-07 DIAGNOSIS — Z98.890 OTHER SPECIFIED POSTPROCEDURAL STATES: Chronic | ICD-10-CM

## 2023-05-07 DIAGNOSIS — M54.9 DORSALGIA, UNSPECIFIED: ICD-10-CM

## 2023-05-07 DIAGNOSIS — E04.1 NONTOXIC SINGLE THYROID NODULE: ICD-10-CM

## 2023-05-07 DIAGNOSIS — I10 ESSENTIAL (PRIMARY) HYPERTENSION: ICD-10-CM

## 2023-05-07 LAB
ALBUMIN SERPL ELPH-MCNC: 3.7 G/DL — SIGNIFICANT CHANGE UP (ref 3.3–5)
ALP SERPL-CCNC: 70 U/L — SIGNIFICANT CHANGE UP (ref 40–120)
ALT FLD-CCNC: 17 U/L — SIGNIFICANT CHANGE UP (ref 10–45)
ANION GAP SERPL CALC-SCNC: 10 MMOL/L — SIGNIFICANT CHANGE UP (ref 5–17)
APPEARANCE UR: CLEAR — SIGNIFICANT CHANGE UP
APTT BLD: 29.4 SEC — SIGNIFICANT CHANGE UP (ref 27.5–35.5)
AST SERPL-CCNC: 31 U/L — SIGNIFICANT CHANGE UP (ref 10–40)
BACTERIA # UR AUTO: NEGATIVE — SIGNIFICANT CHANGE UP
BASE EXCESS BLDV CALC-SCNC: 2.9 MMOL/L — SIGNIFICANT CHANGE UP (ref -2–3)
BASOPHILS # BLD AUTO: 0.02 K/UL — SIGNIFICANT CHANGE UP (ref 0–0.2)
BASOPHILS NFR BLD AUTO: 0.3 % — SIGNIFICANT CHANGE UP (ref 0–2)
BILIRUB SERPL-MCNC: 0.6 MG/DL — SIGNIFICANT CHANGE UP (ref 0.2–1.2)
BILIRUB UR-MCNC: NEGATIVE — SIGNIFICANT CHANGE UP
BUN SERPL-MCNC: 15 MG/DL — SIGNIFICANT CHANGE UP (ref 7–23)
CA-I SERPL-SCNC: 1.18 MMOL/L — SIGNIFICANT CHANGE UP (ref 1.15–1.33)
CALCIUM SERPL-MCNC: 9.1 MG/DL — SIGNIFICANT CHANGE UP (ref 8.4–10.5)
CHLORIDE BLDV-SCNC: 102 MMOL/L — SIGNIFICANT CHANGE UP (ref 96–108)
CHLORIDE SERPL-SCNC: 102 MMOL/L — SIGNIFICANT CHANGE UP (ref 96–108)
CO2 BLDV-SCNC: 30 MMOL/L — HIGH (ref 22–26)
CO2 SERPL-SCNC: 23 MMOL/L — SIGNIFICANT CHANGE UP (ref 22–31)
COLOR SPEC: YELLOW — SIGNIFICANT CHANGE UP
CREAT SERPL-MCNC: 0.59 MG/DL — SIGNIFICANT CHANGE UP (ref 0.5–1.3)
DIFF PNL FLD: NEGATIVE — SIGNIFICANT CHANGE UP
EGFR: 93 ML/MIN/1.73M2 — SIGNIFICANT CHANGE UP
EOSINOPHIL # BLD AUTO: 0.19 K/UL — SIGNIFICANT CHANGE UP (ref 0–0.5)
EOSINOPHIL NFR BLD AUTO: 2.5 % — SIGNIFICANT CHANGE UP (ref 0–6)
EPI CELLS # UR: 1 /HPF — SIGNIFICANT CHANGE UP
GAS PNL BLDV: 132 MMOL/L — LOW (ref 136–145)
GAS PNL BLDV: SIGNIFICANT CHANGE UP
GAS PNL BLDV: SIGNIFICANT CHANGE UP
GLUCOSE BLDV-MCNC: 106 MG/DL — HIGH (ref 70–99)
GLUCOSE SERPL-MCNC: 109 MG/DL — HIGH (ref 70–99)
GLUCOSE UR QL: NEGATIVE — SIGNIFICANT CHANGE UP
HCO3 BLDV-SCNC: 28 MMOL/L — SIGNIFICANT CHANGE UP (ref 22–29)
HCT VFR BLD CALC: 35.4 % — LOW (ref 39–50)
HCT VFR BLDA CALC: 37 % — LOW (ref 39–51)
HGB BLD CALC-MCNC: 12.2 G/DL — LOW (ref 12.6–17.4)
HGB BLD-MCNC: 11.7 G/DL — LOW (ref 13–17)
HYALINE CASTS # UR AUTO: 0 /LPF — SIGNIFICANT CHANGE UP (ref 0–2)
IMM GRANULOCYTES NFR BLD AUTO: 0.4 % — SIGNIFICANT CHANGE UP (ref 0–0.9)
INR BLD: 1.09 RATIO — SIGNIFICANT CHANGE UP (ref 0.88–1.16)
KETONES UR-MCNC: NEGATIVE — SIGNIFICANT CHANGE UP
LACTATE BLDV-MCNC: 1.2 MMOL/L — SIGNIFICANT CHANGE UP (ref 0.5–2)
LEUKOCYTE ESTERASE UR-ACNC: NEGATIVE — SIGNIFICANT CHANGE UP
LYMPHOCYTES # BLD AUTO: 0.93 K/UL — LOW (ref 1–3.3)
LYMPHOCYTES # BLD AUTO: 12.1 % — LOW (ref 13–44)
MCHC RBC-ENTMCNC: 30.7 PG — SIGNIFICANT CHANGE UP (ref 27–34)
MCHC RBC-ENTMCNC: 33.1 GM/DL — SIGNIFICANT CHANGE UP (ref 32–36)
MCV RBC AUTO: 92.9 FL — SIGNIFICANT CHANGE UP (ref 80–100)
MONOCYTES # BLD AUTO: 0.58 K/UL — SIGNIFICANT CHANGE UP (ref 0–0.9)
MONOCYTES NFR BLD AUTO: 7.5 % — SIGNIFICANT CHANGE UP (ref 2–14)
NEUTROPHILS # BLD AUTO: 5.95 K/UL — SIGNIFICANT CHANGE UP (ref 1.8–7.4)
NEUTROPHILS NFR BLD AUTO: 77.2 % — HIGH (ref 43–77)
NITRITE UR-MCNC: NEGATIVE — SIGNIFICANT CHANGE UP
NRBC # BLD: 0 /100 WBCS — SIGNIFICANT CHANGE UP (ref 0–0)
PCO2 BLDV: 47 MMHG — SIGNIFICANT CHANGE UP (ref 42–55)
PH BLDV: 7.39 — SIGNIFICANT CHANGE UP (ref 7.32–7.43)
PH UR: 6.5 — SIGNIFICANT CHANGE UP (ref 5–8)
PLATELET # BLD AUTO: 247 K/UL — SIGNIFICANT CHANGE UP (ref 150–400)
PO2 BLDV: 36 MMHG — SIGNIFICANT CHANGE UP (ref 25–45)
POTASSIUM BLDV-SCNC: 4.7 MMOL/L — SIGNIFICANT CHANGE UP (ref 3.5–5.1)
POTASSIUM SERPL-MCNC: 4.8 MMOL/L — SIGNIFICANT CHANGE UP (ref 3.5–5.3)
POTASSIUM SERPL-SCNC: 4.8 MMOL/L — SIGNIFICANT CHANGE UP (ref 3.5–5.3)
PROT SERPL-MCNC: 6.4 G/DL — SIGNIFICANT CHANGE UP (ref 6–8.3)
PROT UR-MCNC: ABNORMAL
PROTHROM AB SERPL-ACNC: 12.7 SEC — SIGNIFICANT CHANGE UP (ref 10.5–13.4)
RBC # BLD: 3.81 M/UL — LOW (ref 4.2–5.8)
RBC # FLD: 13.1 % — SIGNIFICANT CHANGE UP (ref 10.3–14.5)
RBC CASTS # UR COMP ASSIST: 3 /HPF — SIGNIFICANT CHANGE UP (ref 0–4)
SAO2 % BLDV: 57.9 % — LOW (ref 67–88)
SODIUM SERPL-SCNC: 135 MMOL/L — SIGNIFICANT CHANGE UP (ref 135–145)
SP GR SPEC: 1.02 — SIGNIFICANT CHANGE UP (ref 1.01–1.02)
UROBILINOGEN FLD QL: ABNORMAL
WBC # BLD: 7.7 K/UL — SIGNIFICANT CHANGE UP (ref 3.8–10.5)
WBC # FLD AUTO: 7.7 K/UL — SIGNIFICANT CHANGE UP (ref 3.8–10.5)
WBC UR QL: 1 /HPF — SIGNIFICANT CHANGE UP (ref 0–5)

## 2023-05-07 PROCEDURE — 99285 EMERGENCY DEPT VISIT HI MDM: CPT | Mod: GC

## 2023-05-07 PROCEDURE — 74176 CT ABD & PELVIS W/O CONTRAST: CPT | Mod: 26,MA

## 2023-05-07 PROCEDURE — 72125 CT NECK SPINE W/O DYE: CPT | Mod: 26,MA

## 2023-05-07 PROCEDURE — 71250 CT THORAX DX C-: CPT | Mod: 26,MA

## 2023-05-07 PROCEDURE — 72128 CT CHEST SPINE W/O DYE: CPT | Mod: 26,MA

## 2023-05-07 PROCEDURE — 72131 CT LUMBAR SPINE W/O DYE: CPT | Mod: 26,MA

## 2023-05-07 PROCEDURE — 70450 CT HEAD/BRAIN W/O DYE: CPT | Mod: 26,MA

## 2023-05-07 PROCEDURE — 99223 1ST HOSP IP/OBS HIGH 75: CPT

## 2023-05-07 RX ORDER — PRIMIDONE 250 MG/1
50 TABLET ORAL DAILY
Refills: 0 | Status: DISCONTINUED | OUTPATIENT
Start: 2023-05-07 | End: 2023-05-12

## 2023-05-07 RX ORDER — POLYETHYLENE GLYCOL 3350 17 G/17G
17 POWDER, FOR SOLUTION ORAL DAILY
Refills: 0 | Status: DISCONTINUED | OUTPATIENT
Start: 2023-05-07 | End: 2023-05-12

## 2023-05-07 RX ORDER — ACETAMINOPHEN 500 MG
650 TABLET ORAL EVERY 6 HOURS
Refills: 0 | Status: DISCONTINUED | OUTPATIENT
Start: 2023-05-07 | End: 2023-05-12

## 2023-05-07 RX ORDER — OXYCODONE HYDROCHLORIDE 5 MG/1
5 TABLET ORAL EVERY 6 HOURS
Refills: 0 | Status: DISCONTINUED | OUTPATIENT
Start: 2023-05-07 | End: 2023-05-12

## 2023-05-07 RX ORDER — LIPASE/PROTEASE/AMYLASE 16-48-48K
1 CAPSULE,DELAYED RELEASE (ENTERIC COATED) ORAL
Refills: 0 | Status: DISCONTINUED | OUTPATIENT
Start: 2023-05-07 | End: 2023-05-12

## 2023-05-07 RX ORDER — TRAZODONE HCL 50 MG
50 TABLET ORAL AT BEDTIME
Refills: 0 | Status: DISCONTINUED | OUTPATIENT
Start: 2023-05-07 | End: 2023-05-12

## 2023-05-07 RX ORDER — ALPRAZOLAM 0.25 MG
0.25 TABLET ORAL
Refills: 0 | Status: DISCONTINUED | OUTPATIENT
Start: 2023-05-07 | End: 2023-05-12

## 2023-05-07 RX ORDER — ACETAMINOPHEN 500 MG
650 TABLET ORAL ONCE
Refills: 0 | Status: COMPLETED | OUTPATIENT
Start: 2023-05-07 | End: 2023-05-07

## 2023-05-07 RX ORDER — LIPASE/PROTEASE/AMYLASE 16-48-48K
1 CAPSULE,DELAYED RELEASE (ENTERIC COATED) ORAL
Qty: 0 | Refills: 0 | DISCHARGE

## 2023-05-07 RX ORDER — AMLODIPINE BESYLATE 2.5 MG/1
10 TABLET ORAL DAILY
Refills: 0 | Status: DISCONTINUED | OUTPATIENT
Start: 2023-05-07 | End: 2023-05-12

## 2023-05-07 RX ORDER — ALPRAZOLAM 0.25 MG
1 TABLET ORAL
Qty: 0 | Refills: 0 | DISCHARGE

## 2023-05-07 RX ORDER — SENNA PLUS 8.6 MG/1
2 TABLET ORAL AT BEDTIME
Refills: 0 | Status: DISCONTINUED | OUTPATIENT
Start: 2023-05-07 | End: 2023-05-12

## 2023-05-07 RX ORDER — TRAMADOL HYDROCHLORIDE 50 MG/1
50 TABLET ORAL
Refills: 0 | Status: DISCONTINUED | OUTPATIENT
Start: 2023-05-07 | End: 2023-05-12

## 2023-05-07 RX ORDER — TAMSULOSIN HYDROCHLORIDE 0.4 MG/1
0.4 CAPSULE ORAL AT BEDTIME
Refills: 0 | Status: DISCONTINUED | OUTPATIENT
Start: 2023-05-07 | End: 2023-05-12

## 2023-05-07 RX ORDER — ENOXAPARIN SODIUM 100 MG/ML
40 INJECTION SUBCUTANEOUS EVERY 24 HOURS
Refills: 0 | Status: DISCONTINUED | OUTPATIENT
Start: 2023-05-07 | End: 2023-05-10

## 2023-05-07 RX ORDER — FINASTERIDE 5 MG/1
5 TABLET, FILM COATED ORAL DAILY
Refills: 0 | Status: DISCONTINUED | OUTPATIENT
Start: 2023-05-07 | End: 2023-05-12

## 2023-05-07 RX ORDER — PANTOPRAZOLE SODIUM 20 MG/1
40 TABLET, DELAYED RELEASE ORAL
Refills: 0 | Status: DISCONTINUED | OUTPATIENT
Start: 2023-05-07 | End: 2023-05-12

## 2023-05-07 RX ORDER — ATORVASTATIN CALCIUM 80 MG/1
10 TABLET, FILM COATED ORAL AT BEDTIME
Refills: 0 | Status: DISCONTINUED | OUTPATIENT
Start: 2023-05-07 | End: 2023-05-12

## 2023-05-07 RX ORDER — LOSARTAN POTASSIUM 100 MG/1
100 TABLET, FILM COATED ORAL DAILY
Refills: 0 | Status: DISCONTINUED | OUTPATIENT
Start: 2023-05-07 | End: 2023-05-12

## 2023-05-07 RX ORDER — DICLOFENAC SODIUM 75 MG/1
50 TABLET, DELAYED RELEASE ORAL
Refills: 0 | Status: DISCONTINUED | OUTPATIENT
Start: 2023-05-07 | End: 2023-05-12

## 2023-05-07 RX ORDER — MAGNESIUM OXIDE 400 MG ORAL TABLET 241.3 MG
1 TABLET ORAL
Qty: 0 | Refills: 0 | DISCHARGE

## 2023-05-07 RX ORDER — CYCLOSPORINE 0.5 MG/ML
1 EMULSION OPHTHALMIC
Qty: 0 | Refills: 0 | DISCHARGE

## 2023-05-07 RX ORDER — MAGNESIUM OXIDE 400 MG ORAL TABLET 241.3 MG
400 TABLET ORAL DAILY
Refills: 0 | Status: DISCONTINUED | OUTPATIENT
Start: 2023-05-07 | End: 2023-05-12

## 2023-05-07 RX ORDER — BACLOFEN 100 %
5 POWDER (GRAM) MISCELLANEOUS EVERY 8 HOURS
Refills: 0 | Status: DISCONTINUED | OUTPATIENT
Start: 2023-05-07 | End: 2023-05-12

## 2023-05-07 RX ORDER — TRAMADOL HYDROCHLORIDE 50 MG/1
1 TABLET ORAL
Refills: 0 | DISCHARGE

## 2023-05-07 RX ADMIN — TAMSULOSIN HYDROCHLORIDE 0.4 MILLIGRAM(S): 0.4 CAPSULE ORAL at 22:40

## 2023-05-07 RX ADMIN — ENOXAPARIN SODIUM 40 MILLIGRAM(S): 100 INJECTION SUBCUTANEOUS at 22:41

## 2023-05-07 RX ADMIN — Medication 650 MILLIGRAM(S): at 09:58

## 2023-05-07 RX ADMIN — SENNA PLUS 2 TABLET(S): 8.6 TABLET ORAL at 22:39

## 2023-05-07 RX ADMIN — Medication 50 MILLIGRAM(S): at 22:40

## 2023-05-07 RX ADMIN — ATORVASTATIN CALCIUM 10 MILLIGRAM(S): 80 TABLET, FILM COATED ORAL at 22:40

## 2023-05-07 RX ADMIN — Medication 650 MILLIGRAM(S): at 17:45

## 2023-05-07 RX ADMIN — TRAMADOL HYDROCHLORIDE 50 MILLIGRAM(S): 50 TABLET ORAL at 19:08

## 2023-05-07 NOTE — ED PROVIDER NOTE - CLINICAL SUMMARY MEDICAL DECISION MAKING FREE TEXT BOX
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  Most recent fall was this morning while he was in the bathroom.  His granddaughter states that it was unwitnessed and unclear if he hit his head or not.  She says that he has been complaining of more severe back pain recently and has also endorsed leg weakness.  He is not experiencing any urinary incontinence, fecal incontinence, perineal numbness.  He has no recent history of fever, chills, shortness of breath, chest pain.    Review of Systems: All other systems negative except as noted in the HPI    General: Alert, oriented to person, time, place  Psych: mood appropriate  Head: normocephalic; atraumatic  Eyes: conjunctivae clear bilaterally, sclerae anicteric  ENT: no nasal flaring, patent nares  Cardio: Sinus rhythm; S4 heart sound auscultated  Resp: Bilateral rhonchi  GI: Abdomen soft, nontender, nondistended  : No CVA tenderness  Neuro: Diminished strength in bilateral lower extremities; muscular atrophy noted  Skin: No rashes or bruising noted  MSK: Normal movement of extremities  Lymph/Vasc: No LE edema    Medical Decision Makin-year-old male here for frequent falls.  He has history of lumbar hardware which makes him higher risk for spinal cord involvement.  Will obtain CT scan of head, chest, abdomen and pelvis with reconstructed images of spine to assess for new fractures, hardware displacement.  Patient will require admission due to inability to ambulate and need for care coordination and placement in a rehabilitation facility.  Patient has active pain which will be treated and reassessed.

## 2023-05-07 NOTE — H&P ADULT - PROBLEM SELECTOR PLAN 4
Previously seen thyroid nodules seen on CT scans today  No significant changes in size to previous  follow up routine with outpt provider.

## 2023-05-07 NOTE — ED PROVIDER NOTE - ATTENDING CONTRIBUTION TO CARE
89-year-old male presenting with increasing weakness from home with a fall in the bathroom today unwitnessed.  Unknown how many falls patient may have had secondary to lack of imaging in that part portion of the house.  Patient complains of bilateral knee pain and low back pain.  Patient without fevers or chills.  Dry mucous membranes, mild distress secondary to pain, hard of hearing, cooperative, answering all questions appropriately, trachea midline, nontachycardic, nontachypneic, no elicited midline tenderness to cervical thoracic or lumbar spine, patient moving all extremities with equal strength intact, negative logroll and heel strike bilaterally  Kevin Bonilal MD, FACEP: In this physician's medical judgement based on clinical history and physical exam the patient's signs and symptoms lead to differential diagnoses which includes but is not limited to: Electrolyte abnormality, occult CVA, ICH, lumbar compression fractures, UTI, URI    Historical features, symptoms, and clinical exam not consistent with: Spinal cord compression, AAA    Labs were ordered and independently reviewed by me.  EKG was ordered and independently reviewed by me.  Imaging was ordered and reviewed by me.    Appropriate medications for the patient's presenting complaints were ordered, and effects were reassessed.    Patient's records including prior hospital visit, med and medical history were reviewed.  History assisted by granddaughter at bedside  Escalation to admission/observation was considered.    Will follow up on labs, therapeutics, imaging, reassess and disposition as clinically indicated.  *The above represents an initial assessment/impression. Please refer to my progress notes below for potential changes in patient clinical course*

## 2023-05-07 NOTE — ED ADULT NURSE NOTE - CAS TRG GENERAL AIRWAY, MLM
Patent Render Post-Care Instructions In Note?: no Number Of Freeze-Thaw Cycles: 3 freeze-thaw cycles Consent: The patient's consent was obtained including but not limited to risks of crusting, scabbing, blistering, scarring, darker or lighter pigmentary change, recurrence, incomplete removal and infection. Detail Level: Simple Duration Of Freeze Thaw-Cycle (Seconds): 0 Post-Care Instructions: I reviewed with the patient in detail post-care instructions. Patient is to wear sunprotection, and avoid picking at any of the treated lesions. Pt may apply Vaseline or antibiotic ointment to crusted or scabbing areas.

## 2023-05-07 NOTE — ED PROVIDER NOTE - PROGRESS NOTE DETAILS
Tony Queen MD, PGY-1: Patient imaging and labs reviewed.  There is no identifiable cause for the recurrent falls other than his chronic back pain.  He is to be admitted to Dr. Nesbitt for further pain control as well as placement in an appropriate facility.

## 2023-05-07 NOTE — ED ADULT NURSE NOTE - OBJECTIVE STATEMENT
Pt is 89y M with PMH of chronic lower back pain, DM, HTN, complaining of fall this morning. Pt reports walking to toilet this morning and when trying to stand, his legs felt weak and gave out. Pt denies LOC, but pt's granddaughter is unsure about hitting head. Pt reports feeling increasing weakness x5 days, normally able to ambulate with walker but unable to post fall. Upon assessment, pt is unable to lift L leg due to pain. Reports taking aspirin 81mg daily. A&Ox4, hard of hearing, Pashto speaking. Family at bedside.

## 2023-05-07 NOTE — H&P ADULT - HISTORY OF PRESENT ILLNESS
88 yo m with h/o HTN, HLD, BPH, h/o DVT no longer on AC, chronic back pain with previous back surg brought in from home after a fall in the bathroom today unwitnessed. Per granddaughter at bedside since about 5 days ago pt had sudden declining in functional status where is unable to stand and support himself. No trauma or injury. typically about to move around the house with use of a walker. now when trying to stand and walk his legs would give away and he would crumble down. pt also has been complaining of lower back pain since 5 days ago with radiating pain down his legs as well. some tingling in LE. denies any numbness or dec in sensation. has been having urine and bowel movement without loss of control. no saddle anesthesia.     In the ED, VS afebrile. 135/71, 75, 18, 92%RA lying flat not in acute distress .

## 2023-05-07 NOTE — H&P ADULT - NSICDXPASTMEDICALHX_GEN_ALL_CORE_FT
PAST MEDICAL HISTORY:  BPH (benign prostatic hyperplasia)     Chronic back pain     Depression     DVT, lower extremity     High cholesterol     Hypertension

## 2023-05-07 NOTE — ED PROCEDURE NOTE - PROCEDURE ADDITIONAL DETAILS
Tony Queen MD (PGY-1): Emergency Department Focused Ultrasound performed at patient's bedside for educational purposes. The study will have a follow up study performed or was performed in the direct supervision of an ultrasound trained attending.

## 2023-05-07 NOTE — H&P ADULT - NSHPPHYSICALEXAM_GEN_ALL_CORE
T(C): 36.9 (05-07-23 @ 13:45), Max: 37.1 (05-07-23 @ 08:13)  HR: 75 (05-07-23 @ 13:45) (62 - 75)  BP: 135/71 (05-07-23 @ 13:45) (111/55 - 135/71)  RR: 18 (05-07-23 @ 13:45) (16 - 18)  SpO2: 92% (05-07-23 @ 13:45) (92% - 94%)    CONSTITUTIONAL: Well groomed, no apparent distress  EYES: PERRLA and symmetric, EOMI, No conjunctival or scleral injection, non-icteric  ENMT: Oral mucosa with moist membranes. Normal dentition; no pharyngeal injection or exudates  NECK: Supple, symmetric and without tracheal deviation   RESP: No respiratory distress, no use of accessory muscles; CTA b/l, no WRR  CV: RRR, +S1S2, no MRG; no JVD; no peripheral edema  GI: Soft, NT, ND, no rebound, no guarding; no palpable masses   MSK: dec ROM of back and LE due to pain with movement , unable to assess gait   SKIN: No rashes or ulcers noted; no subcutaneous nodules or induration palpable  NEURO: sensation intact in upper and lower extremities b/l to light touch , strength grossly intact   PSYCH: Appropriate insight/judgment; A+O x 3, mood and affect appropriate, recent/remote memory intact

## 2023-05-07 NOTE — H&P ADULT - PROBLEM SELECTOR PLAN 1
Progressive worsening function over past 5 days without acute trigger  Clinical histroy and exam without alarming neurologic findings. mostly acute on chronic LBP  CT T- umbar spine without acute finding though some progression of previously seen compression deformities of multilevel vertebrae compared to previous with diffuse osteopenia.   Will need pain control, PT eval and TLSO brace (call in AM ) for supportive.   D/w family - would like pt to go to rehab   Aggressive bowel regiment, would cont with home pain regiment   Will add low dose opioid for any breakthrough pain issues

## 2023-05-07 NOTE — ED ADULT NURSE NOTE - NSIMPLEMENTINTERV_GEN_ALL_ED
Implemented All Fall Risk Interventions:  [place] to call system. Call bell, personal items and telephone within reach. Instruct patient to call for assistance. Room bathroom lighting operational. Non-slip footwear when patient is off stretcher. Physically safe environment: no spills, clutter or unnecessary equipment. Stretcher in lowest position, wheels locked, appropriate side rails in place. Provide visual cue, wrist band, yellow gown, etc. Monitor gait and stability. Monitor for mental status changes and reorient to person, place, and time. Review medications for side effects contributing to fall risk. Reinforce activity limits and safety measures with patient and family.

## 2023-05-07 NOTE — ED PROVIDER NOTE - CARE PLAN
1 Principal Discharge DX:	Back pain   Principal Discharge DX:	Back pain  Secondary Diagnosis:	General weakness   Principal Discharge DX:	Back pain  Secondary Diagnosis:	General weakness  Secondary Diagnosis:	Adult failure to thrive

## 2023-05-07 NOTE — H&P ADULT - ASSESSMENT
89 yo m with h/o HTN, HLD, BPH, h/o DVT no longer on AC, chronic back pain with previous back surg brought in from home after a unwitnessed fall and back pain

## 2023-05-07 NOTE — ED PROVIDER NOTE - NSICDXPASTMEDICALHX_GEN_ALL_CORE_FT
PAST MEDICAL HISTORY:  BPH (benign prostatic hyperplasia)     Depression     DVT, lower extremity     High cholesterol     Hypertension

## 2023-05-07 NOTE — H&P ADULT - PROBLEM SELECTOR PLAN 3
Cont with home regiment.   monitor urine outpt closely given dec in ambulation with increase pain med usage.

## 2023-05-08 LAB
CULTURE RESULTS: NO GROWTH — SIGNIFICANT CHANGE UP
SPECIMEN SOURCE: SIGNIFICANT CHANGE UP

## 2023-05-08 RX ADMIN — DICLOFENAC SODIUM 50 MILLIGRAM(S): 75 TABLET, DELAYED RELEASE ORAL at 11:45

## 2023-05-08 RX ADMIN — OXYCODONE HYDROCHLORIDE 5 MILLIGRAM(S): 5 TABLET ORAL at 14:40

## 2023-05-08 RX ADMIN — PRIMIDONE 50 MILLIGRAM(S): 250 TABLET ORAL at 11:10

## 2023-05-08 RX ADMIN — Medication 50 MILLIGRAM(S): at 11:06

## 2023-05-08 RX ADMIN — ATORVASTATIN CALCIUM 10 MILLIGRAM(S): 80 TABLET, FILM COATED ORAL at 21:39

## 2023-05-08 RX ADMIN — TAMSULOSIN HYDROCHLORIDE 0.4 MILLIGRAM(S): 0.4 CAPSULE ORAL at 21:39

## 2023-05-08 RX ADMIN — LOSARTAN POTASSIUM 100 MILLIGRAM(S): 100 TABLET, FILM COATED ORAL at 06:32

## 2023-05-08 RX ADMIN — SENNA PLUS 2 TABLET(S): 8.6 TABLET ORAL at 21:40

## 2023-05-08 RX ADMIN — OXYCODONE HYDROCHLORIDE 5 MILLIGRAM(S): 5 TABLET ORAL at 15:20

## 2023-05-08 RX ADMIN — ENOXAPARIN SODIUM 40 MILLIGRAM(S): 100 INJECTION SUBCUTANEOUS at 21:52

## 2023-05-08 RX ADMIN — TRAMADOL HYDROCHLORIDE 50 MILLIGRAM(S): 50 TABLET ORAL at 06:32

## 2023-05-08 RX ADMIN — Medication 1 CAPSULE(S): at 17:12

## 2023-05-08 RX ADMIN — Medication 50 MILLIGRAM(S): at 21:39

## 2023-05-08 RX ADMIN — AMLODIPINE BESYLATE 10 MILLIGRAM(S): 2.5 TABLET ORAL at 06:32

## 2023-05-08 RX ADMIN — MAGNESIUM OXIDE 400 MG ORAL TABLET 400 MILLIGRAM(S): 241.3 TABLET ORAL at 11:10

## 2023-05-08 RX ADMIN — DICLOFENAC SODIUM 50 MILLIGRAM(S): 75 TABLET, DELAYED RELEASE ORAL at 22:00

## 2023-05-08 RX ADMIN — DICLOFENAC SODIUM 50 MILLIGRAM(S): 75 TABLET, DELAYED RELEASE ORAL at 01:27

## 2023-05-08 RX ADMIN — TRAMADOL HYDROCHLORIDE 50 MILLIGRAM(S): 50 TABLET ORAL at 17:12

## 2023-05-08 RX ADMIN — DICLOFENAC SODIUM 50 MILLIGRAM(S): 75 TABLET, DELAYED RELEASE ORAL at 21:39

## 2023-05-08 RX ADMIN — PANTOPRAZOLE SODIUM 40 MILLIGRAM(S): 20 TABLET, DELAYED RELEASE ORAL at 06:32

## 2023-05-08 RX ADMIN — DICLOFENAC SODIUM 50 MILLIGRAM(S): 75 TABLET, DELAYED RELEASE ORAL at 11:09

## 2023-05-08 RX ADMIN — FINASTERIDE 5 MILLIGRAM(S): 5 TABLET, FILM COATED ORAL at 11:10

## 2023-05-08 RX ADMIN — Medication 1 CAPSULE(S): at 08:47

## 2023-05-08 RX ADMIN — Medication 1 TABLET(S): at 11:11

## 2023-05-08 RX ADMIN — DICLOFENAC SODIUM 50 MILLIGRAM(S): 75 TABLET, DELAYED RELEASE ORAL at 01:57

## 2023-05-08 RX ADMIN — Medication 50 MILLIGRAM(S): at 21:45

## 2023-05-08 NOTE — PATIENT PROFILE ADULT - FALL HARM RISK - HARM RISK INTERVENTIONS

## 2023-05-08 NOTE — PHYSICAL THERAPY INITIAL EVALUATION ADULT - PERTINENT HX OF CURRENT PROBLEM, REHAB EVAL
89 yo m with h/o HTN, HLD, BPH, h/o DVT no longer on AC, chronic back pain with previous back surg brought in from home after a fall in the bathroom today unwitnessed. Per granddaughter at bedside since about 5 days ago pt had sudden declining in functional status where is unable to stand and support himself. No trauma or injury. typically about to move around the house with use of a walker. now when trying to stand and walk his legs would give away and he would crumble down. pt also has been complaining of lower back pain since 5 days ago with radiating pain down his legs as well. some tingling in LE. denies any numbness or dec in sensation. has been having urine and bowel movement without loss of control. no saddle anesthesia. Hospital Course: 5/7 CT head: No acute intracranial hemorrhage; 5/7 CT Chest: No acute intrathoracic or intra-abdominal pathology; 5/7 CT abd/pelvis: intra-abdominal pathology; 5/7 CT C-spine: No acute fractures or dislocations. Multilevel cervical spondylosis. 5/7 CT Thoracic spine: Chronic healed left-sided L3 and L4 transverse process fractures.  chronic compression deformities involving T2, T4, T12, L1, L2, L3, and L4; 5/7 CT Lumbar spine:  Chronic healed left-sided L3 and L4 transverse process fractures;

## 2023-05-08 NOTE — PHYSICAL THERAPY INITIAL EVALUATION ADULT - IMPAIRMENTS FOUND, PT EVAL
aerobic capacity/endurance/gait, locomotion, and balance/gross motor/joint integrity and mobility/muscle strength/sensory integrity

## 2023-05-08 NOTE — PROGRESS NOTE ADULT - ASSESSMENT
89 yo m with h/o HTN, HLD, BPH, h/o DVT no longer on AC, chronic back pain with previous back surg brought in from home after a unwitnessed fall and back pain       Back pain.   - Progressive worsening function over past 5 days without acute trigger  - Clinical histroy and exam without alarming neurologic findings. mostly acute on chronic LBP  - CT Spine without acute finding though some progression of previously seen compression deformities of multilevel vertebrae compared to previous with diffuse osteopenia.   - pain control, PT eval and TLSO brace   - D/w family - would like pt to go to rehab   - Aggressive bowel regiment   - Fall precautions     Fall  - Denies headstrike or LOC   - Head CT negative  - UCx neg   - Fall precautions  - Check TTE   - Check Pro-Bob in AM    HTN (hypertension).   - COnt with home BP meds  - monitor and titrate.    BPH without urinary obstruction.   - Cont with home regiment.   - monitor urine outpt closely given dec in ambulation with increase pain med usage.    H/O DVT   - Check LE duplex  - Per family not on AC    -Thyroid nodule.   - Previously seen thyroid nodules seen on CT scan   - No significant changes in size to previous  - Outpatient follow up with PCP for thyroid US as an outpatient         PPX  - Lovenox for DVT PPX       Discussed with Family at the bedside

## 2023-05-08 NOTE — PHYSICAL THERAPY INITIAL EVALUATION ADULT - ACTIVE RANGE OF MOTION EXAMINATION, REHAB EVAL
BLE 25 % of the total ROM/bilateral upper extremity Active ROM was WFL (within functional limits)/deficits as listed below

## 2023-05-08 NOTE — PHYSICAL THERAPY INITIAL EVALUATION ADULT - ADDITIONAL COMMENTS
Patient is Mongolian speaking; granddaughter present at the bedside, who translated t/o session. Patient lives with his wife in an elevator accessible apartment building; no stairs to negotiate. As per family, PTA, pt. was independent in ambulation using RW; ambulated only short distances 2/2 SOB; family has placed chairs all over house for patient to take sitting rest. As per family, pt. was independent in ADLs; has HHA for 8 hrs/week for cleaning and IADL support. According to family, pt.'s wife cannot be pt.'s primary care giver.

## 2023-05-09 ENCOUNTER — TRANSCRIPTION ENCOUNTER (OUTPATIENT)
Age: 88
End: 2023-05-09

## 2023-05-09 LAB
ANION GAP SERPL CALC-SCNC: 12 MMOL/L — SIGNIFICANT CHANGE UP (ref 5–17)
BUN SERPL-MCNC: 16 MG/DL — SIGNIFICANT CHANGE UP (ref 7–23)
CALCIUM SERPL-MCNC: 8.9 MG/DL — SIGNIFICANT CHANGE UP (ref 8.4–10.5)
CHLORIDE SERPL-SCNC: 101 MMOL/L — SIGNIFICANT CHANGE UP (ref 96–108)
CO2 SERPL-SCNC: 24 MMOL/L — SIGNIFICANT CHANGE UP (ref 22–31)
CREAT SERPL-MCNC: 0.82 MG/DL — SIGNIFICANT CHANGE UP (ref 0.5–1.3)
D DIMER BLD IA.RAPID-MCNC: 815 NG/ML DDU — HIGH
EGFR: 84 ML/MIN/1.73M2 — SIGNIFICANT CHANGE UP
GLUCOSE SERPL-MCNC: 85 MG/DL — SIGNIFICANT CHANGE UP (ref 70–99)
HCT VFR BLD CALC: 36.9 % — LOW (ref 39–50)
HGB BLD-MCNC: 12.1 G/DL — LOW (ref 13–17)
MCHC RBC-ENTMCNC: 30.6 PG — SIGNIFICANT CHANGE UP (ref 27–34)
MCHC RBC-ENTMCNC: 32.8 GM/DL — SIGNIFICANT CHANGE UP (ref 32–36)
MCV RBC AUTO: 93.4 FL — SIGNIFICANT CHANGE UP (ref 80–100)
NRBC # BLD: 0 /100 WBCS — SIGNIFICANT CHANGE UP (ref 0–0)
PLATELET # BLD AUTO: 235 K/UL — SIGNIFICANT CHANGE UP (ref 150–400)
POTASSIUM SERPL-MCNC: 4.2 MMOL/L — SIGNIFICANT CHANGE UP (ref 3.5–5.3)
POTASSIUM SERPL-SCNC: 4.2 MMOL/L — SIGNIFICANT CHANGE UP (ref 3.5–5.3)
PROCALCITONIN SERPL-MCNC: 0.09 NG/ML — SIGNIFICANT CHANGE UP (ref 0.02–0.1)
RBC # BLD: 3.95 M/UL — LOW (ref 4.2–5.8)
RBC # FLD: 13.1 % — SIGNIFICANT CHANGE UP (ref 10.3–14.5)
SARS-COV-2 RNA SPEC QL NAA+PROBE: SIGNIFICANT CHANGE UP
SODIUM SERPL-SCNC: 137 MMOL/L — SIGNIFICANT CHANGE UP (ref 135–145)
WBC # BLD: 6.07 K/UL — SIGNIFICANT CHANGE UP (ref 3.8–10.5)
WBC # FLD AUTO: 6.07 K/UL — SIGNIFICANT CHANGE UP (ref 3.8–10.5)

## 2023-05-09 PROCEDURE — 93306 TTE W/DOPPLER COMPLETE: CPT | Mod: 26

## 2023-05-09 RX ADMIN — AMLODIPINE BESYLATE 10 MILLIGRAM(S): 2.5 TABLET ORAL at 05:34

## 2023-05-09 RX ADMIN — SENNA PLUS 2 TABLET(S): 8.6 TABLET ORAL at 21:23

## 2023-05-09 RX ADMIN — TRAMADOL HYDROCHLORIDE 50 MILLIGRAM(S): 50 TABLET ORAL at 05:34

## 2023-05-09 RX ADMIN — ATORVASTATIN CALCIUM 10 MILLIGRAM(S): 80 TABLET, FILM COATED ORAL at 21:23

## 2023-05-09 RX ADMIN — Medication 1 CAPSULE(S): at 09:51

## 2023-05-09 RX ADMIN — TRAMADOL HYDROCHLORIDE 50 MILLIGRAM(S): 50 TABLET ORAL at 22:22

## 2023-05-09 RX ADMIN — FINASTERIDE 5 MILLIGRAM(S): 5 TABLET, FILM COATED ORAL at 11:51

## 2023-05-09 RX ADMIN — Medication 50 MILLIGRAM(S): at 09:51

## 2023-05-09 RX ADMIN — PANTOPRAZOLE SODIUM 40 MILLIGRAM(S): 20 TABLET, DELAYED RELEASE ORAL at 06:12

## 2023-05-09 RX ADMIN — MAGNESIUM OXIDE 400 MG ORAL TABLET 400 MILLIGRAM(S): 241.3 TABLET ORAL at 11:51

## 2023-05-09 RX ADMIN — TRAMADOL HYDROCHLORIDE 50 MILLIGRAM(S): 50 TABLET ORAL at 06:00

## 2023-05-09 RX ADMIN — PRIMIDONE 50 MILLIGRAM(S): 250 TABLET ORAL at 11:51

## 2023-05-09 RX ADMIN — Medication 1 CAPSULE(S): at 11:51

## 2023-05-09 RX ADMIN — OXYCODONE HYDROCHLORIDE 5 MILLIGRAM(S): 5 TABLET ORAL at 16:28

## 2023-05-09 RX ADMIN — Medication 1 CAPSULE(S): at 17:33

## 2023-05-09 RX ADMIN — Medication 50 MILLIGRAM(S): at 21:22

## 2023-05-09 RX ADMIN — DICLOFENAC SODIUM 50 MILLIGRAM(S): 75 TABLET, DELAYED RELEASE ORAL at 09:51

## 2023-05-09 RX ADMIN — OXYCODONE HYDROCHLORIDE 5 MILLIGRAM(S): 5 TABLET ORAL at 16:58

## 2023-05-09 RX ADMIN — TRAMADOL HYDROCHLORIDE 50 MILLIGRAM(S): 50 TABLET ORAL at 21:22

## 2023-05-09 RX ADMIN — DICLOFENAC SODIUM 50 MILLIGRAM(S): 75 TABLET, DELAYED RELEASE ORAL at 22:23

## 2023-05-09 RX ADMIN — Medication 50 MILLIGRAM(S): at 21:23

## 2023-05-09 RX ADMIN — POLYETHYLENE GLYCOL 3350 17 GRAM(S): 17 POWDER, FOR SOLUTION ORAL at 11:51

## 2023-05-09 RX ADMIN — LOSARTAN POTASSIUM 100 MILLIGRAM(S): 100 TABLET, FILM COATED ORAL at 05:34

## 2023-05-09 RX ADMIN — TAMSULOSIN HYDROCHLORIDE 0.4 MILLIGRAM(S): 0.4 CAPSULE ORAL at 21:23

## 2023-05-09 RX ADMIN — ENOXAPARIN SODIUM 40 MILLIGRAM(S): 100 INJECTION SUBCUTANEOUS at 23:09

## 2023-05-09 RX ADMIN — Medication 1 TABLET(S): at 11:51

## 2023-05-09 RX ADMIN — DICLOFENAC SODIUM 50 MILLIGRAM(S): 75 TABLET, DELAYED RELEASE ORAL at 21:23

## 2023-05-09 NOTE — PROGRESS NOTE ADULT - ASSESSMENT
88 yo m with h/o HTN, HLD, BPH, h/o DVT no longer on AC, chronic back pain with previous back surg brought in from home after a unwitnessed fall and back pain       Back pain.   - Progressive worsening function over past 5 days without acute trigger  - Clinical histroy and exam without alarming neurologic findings. mostly acute on chronic LBP  - CT Spine without acute finding though some progression of previously seen compression deformities of multilevel vertebrae compared to previous with diffuse osteopenia.   - pain control, PT eval and TLSO brace   - D/w family - would like pt to go to rehab   - Aggressive bowel regiment   - Fall precautions     Fall  - Denies head strike or LOC   - Head CT negative  - UCx neg   - Fall precautions  - TTE --> EF of 75%, Trace MR, Trace AR  - Pro-Bob Neg     De- Saturation   - Desaturated on RA   - H/O DVT, check LE Duplex and D-dimer    HTN (hypertension).   - COnt with home BP meds  - monitor and titrate.    BPH without urinary obstruction.   - Cont with home regiment.   - monitor urine outpt closely given dec in ambulation with increase pain med usage.    H/O DVT   - Check LE duplex  - Per family not on AC    -Thyroid nodule.   - Previously seen thyroid nodules seen on CT scan   - No significant changes in size to previous  - Outpatient follow up with PCP for thyroid US as an outpatient         PPX  - Lovenox for DVT PPX       Discussed with Family at the bedside. Discussed with Attending and ACP  89 yo m with h/o HTN, HLD, BPH, h/o DVT no longer on AC, chronic back pain with previous back surg brought in from home after a unwitnessed fall and back pain       Back pain.   - Progressive worsening function over past 5 days without acute trigger  - Clinical histroy and exam without alarming neurologic findings. mostly acute on chronic LBP  - CT Spine without acute finding though some progression of previously seen compression deformities of multilevel vertebrae compared to previous with diffuse osteopenia.   - pain control, PT eval and TLSO brace   - D/w family - would like pt to go to rehab   - Aggressive bowel regiment   - Fall precautions     Fall  - Denies head strike or LOC   - Head CT negative  - UCx neg   - Fall precautions  - TTE --> EF of 75%, Trace MR, Trace AR  - Pro-Bob Neg     De- Saturation   - Desaturated on RA   - H/O DVT, check LE Duplex and D-dimer    HTN (hypertension).   - COnt with home BP meds  - monitor and titrate.    BPH without urinary obstruction.   - Cont with home regiment.   - monitor urine outpt closely given dec in ambulation with increase pain med usage.    H/O DVT   - Check LE duplex  - Per family not on AC    -Thyroid nodule.   - Previously seen thyroid nodules seen on CT scan   - No significant changes in size to previous  - Outpatient follow up with PCP for thyroid US as an outpatient         PPX  - Lovenox for DVT PPX       Discussed with Family at the bedside.

## 2023-05-09 NOTE — DISCHARGE NOTE PROVIDER - NSDCMRMEDTOKEN_GEN_ALL_CORE_FT
acetaminophen 325 mg oral tablet: 3 tab(s) orally every 8 hours, As needed, Mild Pain (1 - 3)  amLODIPine 10 mg oral tablet: 1 tab(s) orally once a day  atorvastatin 10 mg oral tablet: 1 tab(s) orally once a day (at bedtime)  B-Complex 50 oral tablet: 1 tab(s) orally once a day  baclofen 10 mg oral tablet: 1 orally 2 times a day  Creon 36,000 units oral delayed release capsule: 1 cap(s) orally 1-4 times a day  Dexilant 60 mg oral delayed release capsule: 1 cap(s) orally once a day  diclofenac sodium 50 mg oral delayed release tablet: 1 orally 2 times a day  docusate sodium 100 mg oral capsule: 1 cap(s) orally 2 times a day  dutasteride 0.5 mg oral capsule: 1 orally once a day  Linzess 145 mcg oral capsule: 1 cap(s) orally once a day  losartan 100 mg oral tablet: 1 tab(s) orally once a day  magnesium oxide 400 mg oral tablet: 1 tab(s) orally once a day  Multiple Vitamins oral tablet: 1 tab(s) orally once a day  Myrbetriq 50 mg oral tablet, extended release: 1 orally once a day  pregabalin 50 mg oral capsule: 1 cap(s) orally 2 times a day  primidone 50 mg oral tablet: 1 tab(s) orally once a day  tamsulosin 0.4 mg oral capsule: 1 cap(s) orally once a day (at bedtime)  TLSO brace: TLSO brace wit PT  traMADol 50 mg oral tablet: 1 orally 2 times a day as needed for  severe pain  traZODone 50 mg oral tablet: 1 tab(s) orally once a day (at bedtime)  Vascepa 1 g oral capsule: 2 cap(s) orally 2 times a day  Xanax 0.25 mg oral tablet: 1 tab(s) orally 2 times a day, As Needed   acetaminophen 325 mg oral tablet: 3 tab(s) orally every 8 hours, As needed, Mild Pain (1 - 3)  amLODIPine 10 mg oral tablet: 1 tab(s) orally once a day  atorvastatin 10 mg oral tablet: 1 tab(s) orally once a day (at bedtime)  B-Complex 50 oral tablet: 1 tab(s) orally once a day  baclofen 5 mg oral tablet: 1 tab(s) orally every 8 hours As needed Musculoskeletal Pain  Creon 36,000 units oral delayed release capsule: 1 cap(s) orally 1-4 times a day  diclofenac sodium 50 mg oral delayed release tablet: 1 tab(s) orally 2 times a day  finasteride 5 mg oral tablet: 1 tab(s) orally once a day  ipratropium-albuterol 0.5 mg-2.5 mg/3 mL inhalation solution: 3 milliliter(s) inhaled every 6 hours  losartan 100 mg oral tablet: 1 tab(s) orally once a day  magnesium oxide 400 mg oral tablet: 1 tab(s) orally once a day  Multiple Vitamins oral tablet: 1 tab(s) orally once a day  oxyCODONE 5 mg oral tablet: 1 tab(s) orally every 6 hours As needed Severe Pain (7 - 10)  pantoprazole 40 mg oral delayed release tablet: 1 tab(s) orally once a day (before a meal)  polyethylene glycol 3350 oral powder for reconstitution: 17 gram(s) orally once a day  pregabalin 50 mg oral capsule: 1 cap(s) orally 2 times a day  primidone 50 mg oral tablet: 1 tab(s) orally once a day  senna leaf extract oral tablet: 2 tab(s) orally once a day (at bedtime)  tamsulosin 0.4 mg oral capsule: 1 cap(s) orally once a day (at bedtime)  TLSO brace: TLSO brace wit PT  traMADol 50 mg oral tablet: 1 orally 2 times a day as needed for  severe pain  traZODone 50 mg oral tablet: 1 tab(s) orally once a day (at bedtime)  Xanax 0.25 mg oral tablet: 1 tab(s) orally 2 times a day, As Needed

## 2023-05-09 NOTE — DISCHARGE NOTE PROVIDER - NSDCFUADDAPPT_GEN_ALL_CORE_FT
APPTS ARE READY TO BE MADE: [x] YES    Best Family or Patient Contact (if needed):    Additional Information about above appointments (if needed):    1:   2:   3:     Other comments or requests:    APPTS ARE READY TO BE MADE: [x] YES    Best Family or Patient Contact (if needed):    Additional Information about above appointments (if needed):    1:   2:   3:     Other comments or requests:       Patient is being discharged to rehab. Patient/caregiver will arrange follow up care appointments.

## 2023-05-09 NOTE — DISCHARGE NOTE PROVIDER - HOSPITAL COURSE
89 year old male with PMHx of HTN, HLD, BPH, h/o DVT no longer on AC, chronic back pain with previous back surg presented to ED from home after a unwitnessed fall and back pain. CT Spine was obtained revealing no acute finding though some progression of previously seen compression deformities of multilevel vertebrae compared to previous with diffuse osteopenia. Patient was fitted for TLSO brace. PT evaluated patient recommending [facility]. In setting of fall, CT Head was obtained - negative for acute findings. TTE was also obtained revealing the left ventricular systolic function is hyperdynamic with an ejection fraction visually estimated at 75 %. Previously seen thyroid nodules seen on CT scan with no significant changes in size compared to previous -  recommended for Outpatient follow up with PCP for thyroid US as an outpatient     Discharge/Dispo/Med rec discussed with attending. Patient medically cleared for discharge to [facility] with outpatient follow up with PCP

## 2023-05-09 NOTE — DISCHARGE NOTE PROVIDER - CARE PROVIDER_API CALL
Giovanny Hobbs  INTERNAL MEDICINE  [address] [facility] [address], [address]  [place] [address]  Phone: [phone]  Fax: [phone]  Follow Up Time: 1 week

## 2023-05-10 LAB
APTT BLD: 30.2 SEC — SIGNIFICANT CHANGE UP (ref 27.5–35.5)
HCT VFR BLD CALC: 32.1 % — LOW (ref 39–50)
HGB BLD-MCNC: 10.9 G/DL — LOW (ref 13–17)
MCHC RBC-ENTMCNC: 31.6 PG — SIGNIFICANT CHANGE UP (ref 27–34)
MCHC RBC-ENTMCNC: 34 GM/DL — SIGNIFICANT CHANGE UP (ref 32–36)
MCV RBC AUTO: 93 FL — SIGNIFICANT CHANGE UP (ref 80–100)
NRBC # BLD: 0 /100 WBCS — SIGNIFICANT CHANGE UP (ref 0–0)
PLATELET # BLD AUTO: 207 K/UL — SIGNIFICANT CHANGE UP (ref 150–400)
RBC # BLD: 3.45 M/UL — LOW (ref 4.2–5.8)
RBC # FLD: 13.1 % — SIGNIFICANT CHANGE UP (ref 10.3–14.5)
WBC # BLD: 5.5 K/UL — SIGNIFICANT CHANGE UP (ref 3.8–10.5)
WBC # FLD AUTO: 5.5 K/UL — SIGNIFICANT CHANGE UP (ref 3.8–10.5)

## 2023-05-10 PROCEDURE — 71275 CT ANGIOGRAPHY CHEST: CPT | Mod: 26

## 2023-05-10 PROCEDURE — 93970 EXTREMITY STUDY: CPT | Mod: 26

## 2023-05-10 PROCEDURE — 99221 1ST HOSP IP/OBS SF/LOW 40: CPT | Mod: GC

## 2023-05-10 RX ORDER — HEPARIN SODIUM 5000 [USP'U]/ML
INJECTION INTRAVENOUS; SUBCUTANEOUS
Qty: 25000 | Refills: 0 | Status: DISCONTINUED | OUTPATIENT
Start: 2023-05-10 | End: 2023-05-10

## 2023-05-10 RX ORDER — HEPARIN SODIUM 5000 [USP'U]/ML
3000 INJECTION INTRAVENOUS; SUBCUTANEOUS EVERY 6 HOURS
Refills: 0 | Status: DISCONTINUED | OUTPATIENT
Start: 2023-05-10 | End: 2023-05-10

## 2023-05-10 RX ORDER — HEPARIN SODIUM 5000 [USP'U]/ML
6500 INJECTION INTRAVENOUS; SUBCUTANEOUS EVERY 6 HOURS
Refills: 0 | Status: DISCONTINUED | OUTPATIENT
Start: 2023-05-10 | End: 2023-05-10

## 2023-05-10 RX ORDER — ENOXAPARIN SODIUM 100 MG/ML
40 INJECTION SUBCUTANEOUS EVERY 24 HOURS
Refills: 0 | Status: DISCONTINUED | OUTPATIENT
Start: 2023-05-11 | End: 2023-05-12

## 2023-05-10 RX ADMIN — TRAMADOL HYDROCHLORIDE 50 MILLIGRAM(S): 50 TABLET ORAL at 05:07

## 2023-05-10 RX ADMIN — FINASTERIDE 5 MILLIGRAM(S): 5 TABLET, FILM COATED ORAL at 13:03

## 2023-05-10 RX ADMIN — POLYETHYLENE GLYCOL 3350 17 GRAM(S): 17 POWDER, FOR SOLUTION ORAL at 13:04

## 2023-05-10 RX ADMIN — ATORVASTATIN CALCIUM 10 MILLIGRAM(S): 80 TABLET, FILM COATED ORAL at 21:25

## 2023-05-10 RX ADMIN — ENOXAPARIN SODIUM 40 MILLIGRAM(S): 100 INJECTION SUBCUTANEOUS at 23:06

## 2023-05-10 RX ADMIN — SENNA PLUS 2 TABLET(S): 8.6 TABLET ORAL at 21:26

## 2023-05-10 RX ADMIN — Medication 1 CAPSULE(S): at 13:04

## 2023-05-10 RX ADMIN — DICLOFENAC SODIUM 50 MILLIGRAM(S): 75 TABLET, DELAYED RELEASE ORAL at 22:25

## 2023-05-10 RX ADMIN — Medication 1 CAPSULE(S): at 09:26

## 2023-05-10 RX ADMIN — Medication 50 MILLIGRAM(S): at 09:26

## 2023-05-10 RX ADMIN — Medication 1 TABLET(S): at 13:04

## 2023-05-10 RX ADMIN — LOSARTAN POTASSIUM 100 MILLIGRAM(S): 100 TABLET, FILM COATED ORAL at 05:08

## 2023-05-10 RX ADMIN — HEPARIN SODIUM 1400 UNIT(S)/HR: 5000 INJECTION INTRAVENOUS; SUBCUTANEOUS at 14:32

## 2023-05-10 RX ADMIN — Medication 5 MILLIGRAM(S): at 07:55

## 2023-05-10 RX ADMIN — AMLODIPINE BESYLATE 10 MILLIGRAM(S): 2.5 TABLET ORAL at 05:07

## 2023-05-10 RX ADMIN — MAGNESIUM OXIDE 400 MG ORAL TABLET 400 MILLIGRAM(S): 241.3 TABLET ORAL at 13:04

## 2023-05-10 RX ADMIN — PANTOPRAZOLE SODIUM 40 MILLIGRAM(S): 20 TABLET, DELAYED RELEASE ORAL at 05:08

## 2023-05-10 RX ADMIN — DICLOFENAC SODIUM 50 MILLIGRAM(S): 75 TABLET, DELAYED RELEASE ORAL at 09:26

## 2023-05-10 RX ADMIN — Medication 1 CAPSULE(S): at 18:06

## 2023-05-10 RX ADMIN — PRIMIDONE 50 MILLIGRAM(S): 250 TABLET ORAL at 13:04

## 2023-05-10 RX ADMIN — Medication 50 MILLIGRAM(S): at 21:25

## 2023-05-10 RX ADMIN — DICLOFENAC SODIUM 50 MILLIGRAM(S): 75 TABLET, DELAYED RELEASE ORAL at 10:00

## 2023-05-10 RX ADMIN — TRAMADOL HYDROCHLORIDE 50 MILLIGRAM(S): 50 TABLET ORAL at 18:06

## 2023-05-10 RX ADMIN — DICLOFENAC SODIUM 50 MILLIGRAM(S): 75 TABLET, DELAYED RELEASE ORAL at 21:25

## 2023-05-10 RX ADMIN — TAMSULOSIN HYDROCHLORIDE 0.4 MILLIGRAM(S): 0.4 CAPSULE ORAL at 21:26

## 2023-05-10 NOTE — PROGRESS NOTE ADULT - ASSESSMENT
88 yo m with h/o HTN, HLD, BPH, h/o DVT no longer on AC, chronic back pain with previous back surg brought in from home after a unwitnessed fall and back pain       Back pain.   - Progressive worsening function over past few days prior to arrival   - CT Spine without acute finding though some progression of previously seen compression deformities of multilevel vertebrae compared to previous with diffuse osteopenia.   - Pain control, PT eval and TLSO brace   - D/w family - would like pt to go to rehab   - Aggressive bowel regiment   - Fall precautions     Fall  - Denies head strike or LOC   - Head CT negative  - UCx neg   - Fall precautions  - TTE --> EF of 75%, Trace MR, Trace AR  - Pro-Bob Neg     Hypoxia   - Desaturated on RA   - Supplement O2 PRN to maintain adequate oxygenation   - H/O DVT, Duplex w/ Prior DVT residue, postphlebitic changes   - Elevated D-Dimer, Check CTA   - Started on AC for DVT. Monitor PTT and adjust as per normogram. Monitor H/H. Family agreed for AC, Risks and benefits understood.     HTN (hypertension).   - COnt with home BP meds  - monitor and titrate.    BPH without urinary obstruction.   - Cont with home regiment.   - monitor urine outpt closely given dec in ambulation with increase pain med usage.    -Thyroid nodule.   - Previously seen thyroid nodules seen on CT scan   - No significant changes in size to previous  - Outpatient follow up with PCP for thyroid US as an outpatient         PPX  - AC       Discussed with Family at the bedside. Discussed with dapa Nieto via telephone at [phone]. Plan of care reviewed.  89 yo m with h/o HTN, HLD, BPH, h/o DVT no longer on AC, chronic back pain with previous back surg brought in from home after a unwitnessed fall and back pain       Back pain.   - Progressive worsening function over past few days prior to arrival   - CT Spine without acute finding though some progression of previously seen compression deformities of multilevel vertebrae compared to previous with diffuse osteopenia.   - Pain control, PT eval and TLSO brace   - D/w family - would like pt to go to rehab   - Aggressive bowel regiment   - Fall precautions     Fall  - Denies head strike or LOC   - Head CT negative  - UCx neg   - Fall precautions  - TTE --> EF of 75%, Trace MR, Trace AR  - Pro-Bob Neg     Hypoxia   - Desaturated on RA   - Supplement O2 PRN to maintain adequate oxygenation   - H/O DVT, Duplex w/ Prior DVT residue, postphlebitic changes   - Elevated D-Dimer, Check CTA   - discussed with vascular regarding us findings. no need for ac     HTN (hypertension).   - COnt with home BP meds  - monitor and titrate.    BPH without urinary obstruction.   - Cont with home regiment.   - monitor urine outpt closely given dec in ambulation with increase pain med usage.    -Thyroid nodule.   - Previously seen thyroid nodules seen on CT scan   - No significant changes in size to previous  - Outpatient follow up with PCP for thyroid US as an outpatient         PPX  - AC       Discussed with Family at the bedside. Discussed with armaan Nieto via telephone at [phone]. Plan of care reviewed.

## 2023-05-10 NOTE — DIETITIAN INITIAL EVALUATION ADULT - PERTINENT LABORATORY DATA
05-09    137  |  101  |  16  ----------------------------<  85  4.2   |  24  |  0.82    Ca    8.9      09 May 2023 10:19

## 2023-05-10 NOTE — SWALLOW BEDSIDE ASSESSMENT ADULT - ORAL PREPARATORY PHASE
with larger bolus size/straw sips - suspected reduced bolus control resulting in premature spillover; improved w/ single cup sips Within functional limits

## 2023-05-10 NOTE — DIETITIAN INITIAL EVALUATION ADULT - NS FNS DIET ORDER
Diet, Regular:   DASH/TLC {Sodium & Cholesterol Restricted} (DASH)  Easy to Chew (EASYTOCHEW) (05-07-23 @ 17:52) [Active]

## 2023-05-10 NOTE — SWALLOW BEDSIDE ASSESSMENT ADULT - PHARYNGEAL PHASE
mild latency in the pharyngeal trigger, adequate hyolaryngeal elevation upon palpation. Single cough following straw sips, eliminated with small single cup sips No overt s/s aspiration/Within functional limits

## 2023-05-10 NOTE — SWALLOW BEDSIDE ASSESSMENT ADULT - ADDITIONAL RECOMMENDATIONS
Goals:  1. Pt will tolerate recommended diet with no overt, clinical s/s of aspiration.  2. Pt/family/caregiver will demonstrate understanding and carryover of dysphagia management (safe swallow guidelines, compensatory strategies, dysphagia diet).

## 2023-05-10 NOTE — SWALLOW BEDSIDE ASSESSMENT ADULT - ASR SWALLOW ASPIRATION MONITOR
Monitor for s/s aspiration/laryngeal penetration. If noted:  D/C p.o. intake, provide non-oral nutrition/hydration/meds, and contact this service @ [phone]/change of breathing pattern/cough/gurgly voice/fever/pneumonia/throat clearing/upper respiratory infection

## 2023-05-10 NOTE — DIETITIAN INITIAL EVALUATION ADULT - ORAL INTAKE PTA/DIET HISTORY
As per Daughter-in-law:  Confirms no known food allergies/intolerances. Reports having a good appetite and PO intakes at home. Did not follow any specific dietary restrictions. Pt has missing teeth, was eating softer foods at home.  Pt denies nausea, vomiting, diarrhea, or constipation. Denies any vitamin/mineral use at home, pt reports not taking oral nutritional supplement at home as well.

## 2023-05-10 NOTE — DIETITIAN INITIAL EVALUATION ADULT - NSFNSGIIOFT_GEN_A_CORE
-- No bowel movement noted since admission, ordered for polyethylene glycol & senna for bowel regimen

## 2023-05-10 NOTE — SWALLOW BEDSIDE ASSESSMENT ADULT - SLP GENERAL OBSERVATIONS
Patient encountered supine in bed, on room air, awake/alert, daughter at bedside. HOB elevated, daughter declined  given pt [place] and offered to translate in Bulgarian. Vocal quality clear, Aox4. Speech 100% intelligible. Pt able to follow multistep directives for purposes of evaluation.

## 2023-05-10 NOTE — DIETITIAN INITIAL EVALUATION ADULT - ADD RECOMMEND
1. Recommend discontinue DASH restriction. Defer diet/texture advancement to medical team/SLP as indicated   2. Continue Multivitamin 1x/Day to help deter micronutrient deficiencies   3. Encourage PO intakes as tolerated. Honor food preferences as appropriate and available.   4. Monitor PO intake, GI tolerance, skin integrity and labs. RD remains available if needed, pt's family  is aware.

## 2023-05-10 NOTE — DIETITIAN INITIAL EVALUATION ADULT - PERTINENT MEDS FT
MEDICATIONS  (STANDING):  amLODIPine   Tablet 10 milliGRAM(s) Oral daily  atorvastatin 10 milliGRAM(s) Oral at bedtime  diclofenac 50 milliGRAM(s) Oral two times a day  enoxaparin Injectable 40 milliGRAM(s) SubCutaneous every 24 hours  finasteride 5 milliGRAM(s) Oral daily  losartan 100 milliGRAM(s) Oral daily  magnesium oxide 400 milliGRAM(s) Oral daily  multivitamin 1 Tablet(s) Oral daily  pancrelipase  (CREON 36,000 Lipase Units) 1 Capsule(s) Oral three times a day with meals  pantoprazole    Tablet 40 milliGRAM(s) Oral before breakfast  polyethylene glycol 3350 17 Gram(s) Oral daily  pregabalin 50 milliGRAM(s) Oral two times a day  primidone 50 milliGRAM(s) Oral daily  senna 2 Tablet(s) Oral at bedtime  tamsulosin 0.4 milliGRAM(s) Oral at bedtime  traMADol 50 milliGRAM(s) Oral two times a day  traZODone 50 milliGRAM(s) Oral at bedtime    MEDICATIONS  (PRN):  acetaminophen     Tablet .. 650 milliGRAM(s) Oral every 6 hours PRN Temp greater or equal to 38C (100.4F), Mild Pain (1 - 3)  ALPRAZolam 0.25 milliGRAM(s) Oral two times a day PRN anxiety  baclofen 5 milliGRAM(s) Oral every 8 hours PRN Musculoskeletal Pain  oxyCODONE    IR 5 milliGRAM(s) Oral every 6 hours PRN Severe Pain (7 - 10)

## 2023-05-10 NOTE — CONSULT NOTE ADULT - ASSESSMENT
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 No evidence of acute DVT. No acute swelling or pain reported.     Plan:  - no acute vascular surgery intervention at this time  - no AC indicated at this time unless if evidence of PE  - plan dw attending  - please reconsult as needed    Vascular Surgery

## 2023-05-10 NOTE — SWALLOW BEDSIDE ASSESSMENT ADULT - SLP PERTINENT HISTORY OF CURRENT PROBLEM
89 yo m with h/o HTN, HLD, BPH, h/o DVT no longer on AC, chronic back pain with previous back surg brought in from home after a unwitnessed fall and back pain.

## 2023-05-10 NOTE — DIETITIAN INITIAL EVALUATION ADULT - REASON INDICATOR FOR ASSESSMENT
Pt has not been seen since 8/19/19. She needs an OV for this, correct? Stage 2 Pressure Injury or greater  Information obtained from: Review of pt's current medical record, interview with pt's daughter-in-law, who was at pt's bed side on [facility].

## 2023-05-10 NOTE — SWALLOW BEDSIDE ASSESSMENT ADULT - COMMENTS
Back pain: Progressive worsening function over past 5 days without acute trigger  Clinical histroy and exam without alarming neurologic findings. mostly acute on chronic LBP  CT T- umbar spine without acute finding though some progression of previously seen compression deformities of multilevel vertebrae compared to previous with diffuse osteopenia.   Will need pain control, PT eval and TLSO brace (call in AM ) for supportive.   D/w family - would like pt to go to rehab.    5/7 CT CHEST IMPRESSION: No acute intrathoracic or intra-abdominal pathology. Refer to dedicated lumbar and thoracic spine CT report for findings regarding the spine.  CT LUMBAR SPINE IMPRESSION: 1. No acute fractures or dislocations. 2. Chronic healed left-sided L3 and L4 transverse process fractures. 3. Additional multilevel chronic compression deformities involving T2, T4, T12, L1, L2, L3, and L4 which appears slightly more progressed when   compared with 2019. 4. Diffuse osteopenia. 5. Multilevel lumbar spondylosis.  Cervical spine CT: No acute fractures or dislocations. Multilevel cervical spondylosis.  2.4 cm right and 1.4 cm left thyroid nodules. Thyroid neoplasm is not excluded. Recommend ultrasound correlation.  Head CT: No acute intracranial hemorrhage, mass effect, or shift of the midline structures.    SWALLOW HISTORY: No reports in SCM or in PACS prior to this admission.

## 2023-05-10 NOTE — SWALLOW BEDSIDE ASSESSMENT ADULT - SWALLOW EVAL: DIAGNOSIS
89 yo m with h/o HTN, HLD, BPH, h/o DVT no longer on AC, chronic back pain with previous back surg brought in from home after a unwitnessed fall and back pain. Pt p/w oropharyngeal dysphagia for thin liquids remarkable for suspected reduced bolus control and premature spillover with larger bolus sizes and straw administration. Single cough noted following straw sips. Bolus control improved with small single cup sips and no overt s/s aspiration. Oral management is adequate for puree consistencies with timely pharyngeal trigger and adequate hyolaryngeal elevation upon digital palpation. Pt's baseline is puree consistencies, therefore solids deferred.

## 2023-05-10 NOTE — SWALLOW BEDSIDE ASSESSMENT ADULT - SPECIFY REASON(S)
to subjectively assess swallow safety/function; r/o dysphagia. As per consult "Assessment and recommendations"

## 2023-05-10 NOTE — SWALLOW BEDSIDE ASSESSMENT ADULT - ASR SWALLOW DENTITION
upper/lower dentures in place; however family has been pureeing food at home for "a while now" given dentures are not comfortable

## 2023-05-10 NOTE — CONSULT NOTE ADULT - SUBJECTIVE AND OBJECTIVE BOX
LESLI YANG [id number]  89y Male  3d    HPI:  88 yo m with h/o HTN, HLD, BPH, h/o DVT no longer on AC, chronic back pain with previous back surg brought in from home after a fall in the bathroom today unwitnessed. Per granddaughter at bedside since about 5 days ago pt had sudden declining in functional status where is unable to stand and support himself. No trauma or injury. typically about to move around the house with use of a walker. now when trying to stand and walk his legs would give away and he would crumble down. pt also has been complaining of lower back pain since 5 days ago with radiating pain down his legs as well. some tingling in LE. denies any numbness or dec in sensation. has been having urine and bowel movement without loss of control. no saddle anesthesia.     In the ED, VS afebrile. 135/71, 75, 18, 92%RA lying flat not in acute distress .  (07 May 2023 17:28)    Vascular surgery consulted to evaluate due to abnormal venous duplex findings. Patient has bilateral leg mild swelling and pain. Hx of left leg DVT many years ago no longer on AC.      PAST MEDICAL & SURGICAL HISTORY:  Hypertension      High cholesterol      Depression      BPH (benign prostatic hyperplasia)      DVT, lower extremity      Chronic back pain      H/O Spinal surgery            MEDICATIONS  (STANDING):  amLODIPine   Tablet 10 milliGRAM(s) Oral daily  atorvastatin 10 milliGRAM(s) Oral at bedtime  diclofenac 50 milliGRAM(s) Oral two times a day  finasteride 5 milliGRAM(s) Oral daily  losartan 100 milliGRAM(s) Oral daily  magnesium oxide 400 milliGRAM(s) Oral daily  multivitamin 1 Tablet(s) Oral daily  pancrelipase  (CREON 36,000 Lipase Units) 1 Capsule(s) Oral three times a day with meals  pantoprazole    Tablet 40 milliGRAM(s) Oral before breakfast  polyethylene glycol 3350 17 Gram(s) Oral daily  pregabalin 50 milliGRAM(s) Oral two times a day  primidone 50 milliGRAM(s) Oral daily  senna 2 Tablet(s) Oral at bedtime  tamsulosin 0.4 milliGRAM(s) Oral at bedtime  traMADol 50 milliGRAM(s) Oral two times a day  traZODone 50 milliGRAM(s) Oral at bedtime    MEDICATIONS  (PRN):  acetaminophen     Tablet .. 650 milliGRAM(s) Oral every 6 hours PRN Temp greater or equal to 38C (100.4F), Mild Pain (1 - 3)  ALPRAZolam 0.25 milliGRAM(s) Oral two times a day PRN anxiety  baclofen 5 milliGRAM(s) Oral every 8 hours PRN Musculoskeletal Pain  oxyCODONE    IR 5 milliGRAM(s) Oral every 6 hours PRN Severe Pain (7 - 10)      Allergies    No Known Allergies    Intolerances        REVIEW OF SYSTEMS    [ ] A ten-point review of systems was otherwise negative except as noted.  [x ] Due to altered mental status/intubation, subjective information were not able to be obtained from the patient. History was obtained, to the extent possible, from review of the chart and collateral sources of information.      Vital Signs Last 24 Hrs  T(C): 36.7 (10 May 2023 14:25), Max: 36.9 (10 May 2023 05:10)  T(F): 98 (10 May 2023 14:25), Max: 98.4 (10 May 2023 05:10)  HR: 81 (10 May 2023 14:25) (72 - 83)  BP: 102/65 (10 May 2023 14:25) (102/65 - 118/62)  BP(mean): --  RR: 18 (10 May 2023 14:25) (18 - 18)  SpO2: 92% (10 May 2023 05:10) (91% - 92%)    Parameters below as of 10 May 2023 05:10  Patient On (Oxygen Delivery Method): room air        PHYSICAL EXAM:  GENERAL: NAD  CHEST/LUNG: mildly increased WOB  HEART: Regular rate and rhythm  ABDOMEN: Soft, Nontender, Nondistended;   EXTREMITIES:  mild bilateral lower leg edema, normal motor and sensory function, feet warm and well perfused      LABS:  Labs:  CAPILLARY BLOOD GLUCOSE                              12.1   6.07  )-----------( 235      ( 09 May 2023 10:19 )             36.9         05-09    137  |  101  |  16  ----------------------------<  85  4.2   |  24  |  0.82      RADIOLOGY & ADDITIONAL STUDIES:  < from: [facility] Duplex Lower Ext Vein Scan, Bilat (05.10.23 @ 12:41) >  IMPRESSION:    No acute DVT identified.    Postphlebitic changes, the residue of prior DVT affect the left femoral,   popliteal and calf veins.    < end of copied text >

## 2023-05-10 NOTE — DIETITIAN INITIAL EVALUATION ADULT - REASON FOR ADMISSION
Chart Reviewed, Events Noted  "89 yo m with h/o HTN, HLD, BPH, h/o DVT no longer on AC, chronic back pain with previous back surg brought in from home after a fall in the bathroom today unwitnessed. Per granddaughter at bedside since about 5 days ago pt had sudden declining in functional status where is unable to stand and support himself. No trauma or injury. typically about to move around the house with use of a walker. now when trying to stand and walk his legs would give away and he would crumble down. pt also has been complaining of lower back pain since 5 days ago with radiating pain down his legs as well. some tingling in LE. denies any numbness or dec in sensation. has been having urine and bowel movement without loss of control. no saddle anesthesia."

## 2023-05-11 LAB
ANION GAP SERPL CALC-SCNC: 10 MMOL/L — SIGNIFICANT CHANGE UP (ref 5–17)
APTT BLD: 34.5 SEC — SIGNIFICANT CHANGE UP (ref 27.5–35.5)
BUN SERPL-MCNC: 17 MG/DL — SIGNIFICANT CHANGE UP (ref 7–23)
CALCIUM SERPL-MCNC: 8.8 MG/DL — SIGNIFICANT CHANGE UP (ref 8.4–10.5)
CHLORIDE SERPL-SCNC: 103 MMOL/L — SIGNIFICANT CHANGE UP (ref 96–108)
CO2 SERPL-SCNC: 24 MMOL/L — SIGNIFICANT CHANGE UP (ref 22–31)
CREAT SERPL-MCNC: 0.88 MG/DL — SIGNIFICANT CHANGE UP (ref 0.5–1.3)
EGFR: 82 ML/MIN/1.73M2 — SIGNIFICANT CHANGE UP
GLUCOSE SERPL-MCNC: 81 MG/DL — SIGNIFICANT CHANGE UP (ref 70–99)
HCT VFR BLD CALC: 34.4 % — LOW (ref 39–50)
HGB BLD-MCNC: 11.6 G/DL — LOW (ref 13–17)
INR BLD: 1.15 RATIO — SIGNIFICANT CHANGE UP (ref 0.88–1.16)
MCHC RBC-ENTMCNC: 31 PG — SIGNIFICANT CHANGE UP (ref 27–34)
MCHC RBC-ENTMCNC: 33.7 GM/DL — SIGNIFICANT CHANGE UP (ref 32–36)
MCV RBC AUTO: 92 FL — SIGNIFICANT CHANGE UP (ref 80–100)
NRBC # BLD: 0 /100 WBCS — SIGNIFICANT CHANGE UP (ref 0–0)
PLATELET # BLD AUTO: 220 K/UL — SIGNIFICANT CHANGE UP (ref 150–400)
POTASSIUM SERPL-MCNC: 4.1 MMOL/L — SIGNIFICANT CHANGE UP (ref 3.5–5.3)
POTASSIUM SERPL-SCNC: 4.1 MMOL/L — SIGNIFICANT CHANGE UP (ref 3.5–5.3)
PROTHROM AB SERPL-ACNC: 13.4 SEC — SIGNIFICANT CHANGE UP (ref 10.5–13.4)
RBC # BLD: 3.74 M/UL — LOW (ref 4.2–5.8)
RBC # FLD: 13.1 % — SIGNIFICANT CHANGE UP (ref 10.3–14.5)
SODIUM SERPL-SCNC: 137 MMOL/L — SIGNIFICANT CHANGE UP (ref 135–145)
WBC # BLD: 5.52 K/UL — SIGNIFICANT CHANGE UP (ref 3.8–10.5)
WBC # FLD AUTO: 5.52 K/UL — SIGNIFICANT CHANGE UP (ref 3.8–10.5)

## 2023-05-11 RX ORDER — BACLOFEN 100 %
1 POWDER (GRAM) MISCELLANEOUS
Refills: 0 | DISCHARGE

## 2023-05-11 RX ORDER — IPRATROPIUM/ALBUTEROL SULFATE 18-103MCG
3 AEROSOL WITH ADAPTER (GRAM) INHALATION EVERY 6 HOURS
Refills: 0 | Status: DISCONTINUED | OUTPATIENT
Start: 2023-05-11 | End: 2023-05-12

## 2023-05-11 RX ORDER — POLYETHYLENE GLYCOL 3350 17 G/17G
17 POWDER, FOR SOLUTION ORAL
Qty: 0 | Refills: 0 | DISCHARGE
Start: 2023-05-11

## 2023-05-11 RX ORDER — DEXLANSOPRAZOLE 30 MG/1
1 CAPSULE, DELAYED RELEASE ORAL
Qty: 0 | Refills: 0 | DISCHARGE

## 2023-05-11 RX ORDER — LINACLOTIDE 145 UG/1
1 CAPSULE, GELATIN COATED ORAL
Qty: 0 | Refills: 0 | DISCHARGE

## 2023-05-11 RX ORDER — OXYCODONE HYDROCHLORIDE 5 MG/1
1 TABLET ORAL
Qty: 0 | Refills: 0 | DISCHARGE
Start: 2023-05-11

## 2023-05-11 RX ORDER — IPRATROPIUM/ALBUTEROL SULFATE 18-103MCG
3 AEROSOL WITH ADAPTER (GRAM) INHALATION
Qty: 0 | Refills: 0 | DISCHARGE
Start: 2023-05-11

## 2023-05-11 RX ORDER — FINASTERIDE 5 MG/1
1 TABLET, FILM COATED ORAL
Qty: 0 | Refills: 0 | DISCHARGE
Start: 2023-05-11

## 2023-05-11 RX ORDER — BACLOFEN 100 %
1 POWDER (GRAM) MISCELLANEOUS
Qty: 0 | Refills: 0 | DISCHARGE
Start: 2023-05-11

## 2023-05-11 RX ORDER — DICLOFENAC SODIUM 75 MG/1
1 TABLET, DELAYED RELEASE ORAL
Qty: 0 | Refills: 0 | DISCHARGE
Start: 2023-05-11

## 2023-05-11 RX ORDER — DICLOFENAC SODIUM 75 MG/1
1 TABLET, DELAYED RELEASE ORAL
Refills: 0 | DISCHARGE

## 2023-05-11 RX ORDER — MIRABEGRON 50 MG/1
1 TABLET, EXTENDED RELEASE ORAL
Refills: 0 | DISCHARGE

## 2023-05-11 RX ORDER — DUTASTERIDE 0.5 MG/1
1 CAPSULE, LIQUID FILLED ORAL
Refills: 0 | DISCHARGE

## 2023-05-11 RX ORDER — SENNA PLUS 8.6 MG/1
2 TABLET ORAL
Qty: 0 | Refills: 0 | DISCHARGE
Start: 2023-05-11

## 2023-05-11 RX ORDER — PANTOPRAZOLE SODIUM 20 MG/1
1 TABLET, DELAYED RELEASE ORAL
Qty: 0 | Refills: 0 | DISCHARGE
Start: 2023-05-11

## 2023-05-11 RX ORDER — ICOSAPENT ETHYL 500 MG/1
2 CAPSULE, LIQUID FILLED ORAL
Qty: 0 | Refills: 0 | DISCHARGE

## 2023-05-11 RX ADMIN — Medication 1 CAPSULE(S): at 18:38

## 2023-05-11 RX ADMIN — Medication 50 MILLIGRAM(S): at 21:52

## 2023-05-11 RX ADMIN — ATORVASTATIN CALCIUM 10 MILLIGRAM(S): 80 TABLET, FILM COATED ORAL at 21:52

## 2023-05-11 RX ADMIN — Medication 3 MILLILITER(S): at 18:39

## 2023-05-11 RX ADMIN — Medication 3 MILLILITER(S): at 23:26

## 2023-05-11 RX ADMIN — Medication 3 MILLILITER(S): at 12:41

## 2023-05-11 RX ADMIN — DICLOFENAC SODIUM 50 MILLIGRAM(S): 75 TABLET, DELAYED RELEASE ORAL at 21:52

## 2023-05-11 RX ADMIN — DICLOFENAC SODIUM 50 MILLIGRAM(S): 75 TABLET, DELAYED RELEASE ORAL at 13:00

## 2023-05-11 RX ADMIN — Medication 50 MILLIGRAM(S): at 12:39

## 2023-05-11 RX ADMIN — TRAMADOL HYDROCHLORIDE 50 MILLIGRAM(S): 50 TABLET ORAL at 18:38

## 2023-05-11 RX ADMIN — ENOXAPARIN SODIUM 40 MILLIGRAM(S): 100 INJECTION SUBCUTANEOUS at 23:26

## 2023-05-11 RX ADMIN — AMLODIPINE BESYLATE 10 MILLIGRAM(S): 2.5 TABLET ORAL at 05:29

## 2023-05-11 RX ADMIN — TRAMADOL HYDROCHLORIDE 50 MILLIGRAM(S): 50 TABLET ORAL at 05:29

## 2023-05-11 RX ADMIN — LOSARTAN POTASSIUM 100 MILLIGRAM(S): 100 TABLET, FILM COATED ORAL at 05:29

## 2023-05-11 RX ADMIN — PANTOPRAZOLE SODIUM 40 MILLIGRAM(S): 20 TABLET, DELAYED RELEASE ORAL at 05:29

## 2023-05-11 RX ADMIN — DICLOFENAC SODIUM 50 MILLIGRAM(S): 75 TABLET, DELAYED RELEASE ORAL at 22:52

## 2023-05-11 RX ADMIN — SENNA PLUS 2 TABLET(S): 8.6 TABLET ORAL at 21:52

## 2023-05-11 RX ADMIN — TAMSULOSIN HYDROCHLORIDE 0.4 MILLIGRAM(S): 0.4 CAPSULE ORAL at 21:53

## 2023-05-11 RX ADMIN — DICLOFENAC SODIUM 50 MILLIGRAM(S): 75 TABLET, DELAYED RELEASE ORAL at 12:37

## 2023-05-11 NOTE — PROGRESS NOTE ADULT - NS ATTEND AMEND GEN_ALL_CORE FT
Pt care and plan discussed and reviewed with PA. Plan as outlined above edited by me to reflect our discussion.
Pt care and plan discussed and reviewed with PA. Plan as outlined above edited by me to reflect our discussion. Advanced care planning/advanced directives discussed with patient/family. DNR status including forceful chest compressions to attempt to restart the heart, ventilator support/artificial breathing, electric shock, artificial nutrition, health care proxy, Molst form all discussed with pt.
Pt care and plan discussed and reviewed with PA. Plan as outlined above edited by me to reflect our discussion. Advanced care planning/advanced directives discussed with patient/family. DNR status including forceful chest compressions to attempt to restart the heart, ventilator support/artificial breathing, electric shock, artificial nutrition, health care proxy, Molst form all discussed with pt.     discussed with patients granddaughter
Pt care and plan discussed and reviewed with PA. Plan as outlined above edited by me to reflect our discussion.

## 2023-05-11 NOTE — PROGRESS NOTE ADULT - ASSESSMENT
89 yo m with h/o HTN, HLD, BPH, h/o DVT no longer on AC, chronic back pain with previous back surg brought in from home after a unwitnessed fall and back pain       Back pain.   - Progressive worsening function over past few days prior to arrival   - CT Spine without acute finding though some progression of previously seen compression deformities of multilevel vertebrae compared to previous with diffuse osteopenia.   - Pain control, PT eval and TLSO brace   - D/w family - would like pt to go to rehab   - Bowel regiment   - Fall precautions     Fall  - Denies head strike or LOC   - Head CT negative  - UCx neg   - Fall precautions  - TTE --> EF of 75%, Trace MR, Trace AR    Hypoxia, Wheezing   - Desaturated on RA   - Supplement O2 PRN to maintain adequate oxygenation   - H/O DVT, Duplex w/ Prior DVT residue, postphlebitic changes   - Elevated D-Dimer, Check CTA -- Pre-alcantara Neg for PE, W/ Patchy GGO; F/u final  - Vascular eval appreciated; No intervention at this time; Hold off on AC at this time   - Started on Duoneb; Monitor   - Check O2 saturation at rest and on RA, Provider to RN placed  - Check RVP  - Monitor O2 saturation closely     HTN (hypertension).   - Cont with home BP meds  - monitor and titrate.    BPH without urinary obstruction.   - Cont with home regiment.   - 05/07 UCx negative   - monitor urine outpt closely     Thyroid nodule.   -  Previously seen thyroid nodules seen on CT scan   - No significant changes in size to previous  - Outpatient follow up with PCP for thyroid US as an outpatient         PPX  - DVT PPX --> Lovenox 40 Qd     Discussed with Attending and ACP.   Discussed with granddaughter Emilia via telephone at [phone]. Plan of care reviewed.

## 2023-05-12 ENCOUNTER — TRANSCRIPTION ENCOUNTER (OUTPATIENT)
Age: 88
End: 2023-05-12

## 2023-05-12 VITALS
OXYGEN SATURATION: 91 % | HEART RATE: 96 BPM | TEMPERATURE: 98 F | DIASTOLIC BLOOD PRESSURE: 68 MMHG | RESPIRATION RATE: 18 BRPM | SYSTOLIC BLOOD PRESSURE: 135 MMHG

## 2023-05-12 LAB
HCT VFR BLD CALC: 35.3 % — LOW (ref 39–50)
HGB BLD-MCNC: 11.4 G/DL — LOW (ref 13–17)
MCHC RBC-ENTMCNC: 31.1 PG — SIGNIFICANT CHANGE UP (ref 27–34)
MCHC RBC-ENTMCNC: 32.3 GM/DL — SIGNIFICANT CHANGE UP (ref 32–36)
MCV RBC AUTO: 96.4 FL — SIGNIFICANT CHANGE UP (ref 80–100)
NRBC # BLD: 0 /100 WBCS — SIGNIFICANT CHANGE UP (ref 0–0)
PLATELET # BLD AUTO: 208 K/UL — SIGNIFICANT CHANGE UP (ref 150–400)
RBC # BLD: 3.66 M/UL — LOW (ref 4.2–5.8)
RBC # FLD: 13.2 % — SIGNIFICANT CHANGE UP (ref 10.3–14.5)
WBC # BLD: 8.3 K/UL — SIGNIFICANT CHANGE UP (ref 3.8–10.5)
WBC # FLD AUTO: 8.3 K/UL — SIGNIFICANT CHANGE UP (ref 3.8–10.5)

## 2023-05-12 PROCEDURE — 74176 CT ABD & PELVIS W/O CONTRAST: CPT | Mod: MA

## 2023-05-12 PROCEDURE — 85025 COMPLETE CBC W/AUTO DIFF WBC: CPT

## 2023-05-12 PROCEDURE — 82330 ASSAY OF CALCIUM: CPT

## 2023-05-12 PROCEDURE — C8929: CPT

## 2023-05-12 PROCEDURE — 84132 ASSAY OF SERUM POTASSIUM: CPT

## 2023-05-12 PROCEDURE — 97110 THERAPEUTIC EXERCISES: CPT

## 2023-05-12 PROCEDURE — 81001 URINALYSIS AUTO W/SCOPE: CPT

## 2023-05-12 PROCEDURE — 83605 ASSAY OF LACTIC ACID: CPT

## 2023-05-12 PROCEDURE — 82803 BLOOD GASES ANY COMBINATION: CPT

## 2023-05-12 PROCEDURE — 97530 THERAPEUTIC ACTIVITIES: CPT

## 2023-05-12 PROCEDURE — 72125 CT NECK SPINE W/O DYE: CPT | Mod: MA

## 2023-05-12 PROCEDURE — 85027 COMPLETE CBC AUTOMATED: CPT

## 2023-05-12 PROCEDURE — 85610 PROTHROMBIN TIME: CPT

## 2023-05-12 PROCEDURE — 80048 BASIC METABOLIC PNL TOTAL CA: CPT

## 2023-05-12 PROCEDURE — 84295 ASSAY OF SERUM SODIUM: CPT

## 2023-05-12 PROCEDURE — 82435 ASSAY OF BLOOD CHLORIDE: CPT

## 2023-05-12 PROCEDURE — U0005: CPT

## 2023-05-12 PROCEDURE — 80053 COMPREHEN METABOLIC PANEL: CPT

## 2023-05-12 PROCEDURE — 94640 AIRWAY INHALATION TREATMENT: CPT

## 2023-05-12 PROCEDURE — 84145 PROCALCITONIN (PCT): CPT

## 2023-05-12 PROCEDURE — U0003: CPT

## 2023-05-12 PROCEDURE — 92610 EVALUATE SWALLOWING FUNCTION: CPT

## 2023-05-12 PROCEDURE — 97161 PT EVAL LOW COMPLEX 20 MIN: CPT

## 2023-05-12 PROCEDURE — 85730 THROMBOPLASTIN TIME PARTIAL: CPT

## 2023-05-12 PROCEDURE — 36415 COLL VENOUS BLD VENIPUNCTURE: CPT

## 2023-05-12 PROCEDURE — 99285 EMERGENCY DEPT VISIT HI MDM: CPT

## 2023-05-12 PROCEDURE — 82947 ASSAY GLUCOSE BLOOD QUANT: CPT

## 2023-05-12 PROCEDURE — 85014 HEMATOCRIT: CPT

## 2023-05-12 PROCEDURE — 87086 URINE CULTURE/COLONY COUNT: CPT

## 2023-05-12 PROCEDURE — 71250 CT THORAX DX C-: CPT | Mod: MA

## 2023-05-12 PROCEDURE — 85379 FIBRIN DEGRADATION QUANT: CPT

## 2023-05-12 PROCEDURE — 93970 EXTREMITY STUDY: CPT

## 2023-05-12 PROCEDURE — 71275 CT ANGIOGRAPHY CHEST: CPT

## 2023-05-12 PROCEDURE — 85018 HEMOGLOBIN: CPT

## 2023-05-12 PROCEDURE — 70450 CT HEAD/BRAIN W/O DYE: CPT | Mod: MA

## 2023-05-12 RX ADMIN — Medication 3 MILLILITER(S): at 05:37

## 2023-05-12 RX ADMIN — TRAMADOL HYDROCHLORIDE 50 MILLIGRAM(S): 50 TABLET ORAL at 05:37

## 2023-05-12 RX ADMIN — AMLODIPINE BESYLATE 10 MILLIGRAM(S): 2.5 TABLET ORAL at 05:37

## 2023-05-12 RX ADMIN — LOSARTAN POTASSIUM 100 MILLIGRAM(S): 100 TABLET, FILM COATED ORAL at 05:37

## 2023-05-12 RX ADMIN — PANTOPRAZOLE SODIUM 40 MILLIGRAM(S): 20 TABLET, DELAYED RELEASE ORAL at 05:38

## 2023-05-12 NOTE — PROGRESS NOTE ADULT - SUBJECTIVE AND OBJECTIVE BOX
Name of Patient : LESLI YANG  MRN: [id number]  Date of visit: 05-11-23 @ 12:24      Subjective: Patient seen and examined. No new events except as noted.   Patient seen earlier this AM.   Lying down in bed.   Mild Expiratory wheezing on exam. Started on Duoneb. RVP ordered.   Discussed with Granddaughter Emilia via telephone.     REVIEW OF SYSTEMS:    CONSTITUTIONAL: Generalized weakness  EYES/ENT: No visual changes;  No vertigo or throat pain   NECK: No pain or stiffness  RESPIRATORY: No cough, wheezing, hemoptysis; No shortness of breath  CARDIOVASCULAR: No chest pain or palpitations  GASTROINTESTINAL: No abdominal or epigastric pain. No nausea, vomiting, or hematemesis; No diarrhea or constipation. No melena or hematochezia.  GENITOURINARY: No dysuria, frequency or hematuria  NEUROLOGICAL: No numbness or weakness  SKIN: No itching, burning, rashes, or lesions   All other review of systems is negative unless indicated above.    MEDICATIONS:  MEDICATIONS  (STANDING):  albuterol/ipratropium for Nebulization 3 milliLiter(s) Nebulizer every 6 hours  amLODIPine   Tablet 10 milliGRAM(s) Oral daily  atorvastatin 10 milliGRAM(s) Oral at bedtime  diclofenac 50 milliGRAM(s) Oral two times a day  enoxaparin Injectable 40 milliGRAM(s) SubCutaneous every 24 hours  finasteride 5 milliGRAM(s) Oral daily  losartan 100 milliGRAM(s) Oral daily  magnesium oxide 400 milliGRAM(s) Oral daily  multivitamin 1 Tablet(s) Oral daily  pancrelipase  (CREON 36,000 Lipase Units) 1 Capsule(s) Oral three times a day with meals  pantoprazole    Tablet 40 milliGRAM(s) Oral before breakfast  polyethylene glycol 3350 17 Gram(s) Oral daily  pregabalin 50 milliGRAM(s) Oral two times a day  primidone 50 milliGRAM(s) Oral daily  senna 2 Tablet(s) Oral at bedtime  tamsulosin 0.4 milliGRAM(s) Oral at bedtime  traMADol 50 milliGRAM(s) Oral two times a day  traZODone 50 milliGRAM(s) Oral at bedtime      PHYSICAL EXAM:  T(C): 36.9 (05-11-23 @ 04:38), Max: 36.9 (05-11-23 @ 04:38)  HR: 87 (05-11-23 @ 04:38) (75 - 87)  BP: 131/68 (05-11-23 @ 04:38) (100/60 - 131/68)  RR: 18 (05-11-23 @ 04:38) (18 - 18)  SpO2: 95% (05-11-23 @ 04:38) (95% - 96%)  Wt(kg): --  I&O's Summary    10 May 2023 07:01  -  11 May 2023 07:00  --------------------------------------------------------  IN: 240 mL / OUT: 300 mL / NET: -60 mL          Appearance: Normal	  HEENT: Eyes are open   Lymphatic: No lymphadenopathy   Cardiovascular: Normal    Respiratory: normal effort on NC; Trace expiratory wheezing   Gastrointestinal:  Soft, Non-tender  Skin: No rashes,  warm to touch  Psychiatry:  Mood & affect appropriate  Musculoskeletal: No edema      05-10-23 @ 07:01  -  05-11-23 @ 07:00  --------------------------------------------------------  IN: 240 mL / OUT: 300 mL / NET: -60 mL                                11.6   5.52  )-----------( 220      ( 11 May 2023 06:53 )             34.4               05-11    137  |  103  |  17  ----------------------------<  81  4.1   |  24  |  0.88    Ca    8.8      11 May 2023 06:59      PT/INR - ( 11 May 2023 06:55 )   PT: 13.4 sec;   INR: 1.15 ratio         PTT - ( 11 May 2023 06:55 )  PTT:34.5 sec                     < from: CT Angio Chest PE Protocol w/ IV Cont (05.10.23 @ 18:40) >    ******PRELIMINARY REPORT******      ******PRELIMINARY REPORT******         ACC: [id number] EXAM:  CT ANGIO CHEST PULM [facility]   ORDERED BY: CHRISTIANO CEBALLOS     PROCEDURE DATE:  05/10/2023    ******PRELIMINARY REPORT******      ******PRELIMINARY REPORT******           INTERPRETATION:  No main or lobar pulmonary embolism. Distal evaluation   is limited by respiratory motion. Expiratory exam, patchy bilateral   groundglass opacities are present bilaterally.        ******PRELIMINARY REPORT******    ******PRELIMINARY REPORT******        INGE THOMPSON MD; Resident Radiologist  This document is a PRELIMINARY interpretation and is pending final   attending approval. May 10 2023  8:24PM    < end of copied text >        < from: [facility] Duplex Lower Ext Vein Scan, Declan (05.10.23 @ 12:41) >  IMPRESSION:    No acute DVT identified.    Postphlebitic changes, the residue of prior DVT affect the left femoral,   popliteal and calf veins.    NP Pedro informed    --- End of Report ---    < end of copied text >    
Name of Patient : LESLI YANG  MRN: [id number]  Date of visit: 05-12-23 @ 10:43      Subjective: Patient seen and examined. No new events except as noted.   Patient seen earlier this AM. Lying down in bed.   Granddaughter Emilia providing translation via telephone at the patient's bedside at [phone].  Denies Shortness of breath or dyspnea. States his breathing has improved.     REVIEW OF SYSTEMS:    CONSTITUTIONAL: Generalized weakness   EYES/ENT: No visual changes;  No vertigo or throat pain   NECK: No pain or stiffness  RESPIRATORY: No cough, wheezing, hemoptysis; No shortness of breath  CARDIOVASCULAR: No chest pain or palpitations  GASTROINTESTINAL: No abdominal or epigastric pain. No nausea, vomiting, or hematemesis; No diarrhea or constipation. No melena or hematochezia.  GENITOURINARY: No dysuria, frequency or hematuria  NEUROLOGICAL: No numbness or weakness  SKIN: No itching, burning, rashes, or lesions   All other review of systems is negative unless indicated above.    MEDICATIONS:  MEDICATIONS  (STANDING):  albuterol/ipratropium for Nebulization 3 milliLiter(s) Nebulizer every 6 hours  amLODIPine   Tablet 10 milliGRAM(s) Oral daily  atorvastatin 10 milliGRAM(s) Oral at bedtime  diclofenac 50 milliGRAM(s) Oral two times a day  enoxaparin Injectable 40 milliGRAM(s) SubCutaneous every 24 hours  finasteride 5 milliGRAM(s) Oral daily  losartan 100 milliGRAM(s) Oral daily  magnesium oxide 400 milliGRAM(s) Oral daily  multivitamin 1 Tablet(s) Oral daily  pancrelipase  (CREON 36,000 Lipase Units) 1 Capsule(s) Oral three times a day with meals  pantoprazole    Tablet 40 milliGRAM(s) Oral before breakfast  polyethylene glycol 3350 17 Gram(s) Oral daily  pregabalin 50 milliGRAM(s) Oral two times a day  primidone 50 milliGRAM(s) Oral daily  senna 2 Tablet(s) Oral at bedtime  tamsulosin 0.4 milliGRAM(s) Oral at bedtime  traMADol 50 milliGRAM(s) Oral two times a day  traZODone 50 milliGRAM(s) Oral at bedtime      PHYSICAL EXAM:  T(C): 36.9 (05-12-23 @ 08:12), Max: 37.2 (05-11-23 @ 22:11)  HR: 96 (05-12-23 @ 08:12) (82 - 96)  BP: 135/68 (05-12-23 @ 08:12) (102/64 - 135/68)  RR: 18 (05-12-23 @ 08:12) (18 - 18)  SpO2: 91% (05-12-23 @ 08:12) (91% - 96%)  Wt(kg): --  I&O's Summary    11 May 2023 07:01  -  12 May 2023 07:00  --------------------------------------------------------  IN: 540 mL / OUT: 350 mL / NET: 190 mL          Appearance: Normal	  HEENT: Eyes are open   Lymphatic: No lymphadenopathy   Cardiovascular: Normal    Respiratory: normal effort;  No Wheezing    Gastrointestinal:  Soft, Non-tender  Skin: No rashes,  warm to touch  Psychiatry:  Mood & affect appropriate  Musculoskeletal: No edema      05-11-23 @ 07:01  -  05-12-23 @ 07:00  --------------------------------------------------------  IN: 540 mL / OUT: 350 mL / NET: 190 mL                                  11.4   8.30  )-----------( 208      ( 12 May 2023 06:59 )             35.3               05-11    137  |  103  |  17  ----------------------------<  81  4.1   |  24  |  0.88    Ca    8.8      11 May 2023 06:59      PT/INR - ( 11 May 2023 06:55 )   PT: 13.4 sec;   INR: 1.15 ratio         PTT - ( 11 May 2023 06:55 )  PTT:34.5 sec                         < from: CT Angio Chest PE Protocol w/ IV Cont (05.10.23 @ 18:40) >    IMPRESSION:    No main, right main, left main, or lobar pulmonary embolism. Limited   evaluation of segmental and subsegmental pulmonary arteries secondary to   motion and mixing artifact.    < end of copied text >  
patient evaluated measured custom fitted for rigid  TLSO spinal orthosis to aid  thoracic  lumbar compression fractures reduce pain   for OBB use for weight bearing and assist in walking  aid in healing and recovery ordered by Medicine  delivered by [facility] [phone]
Name of Patient : LESLI YANG  MRN: [id number]  Date of visit: 05-10-23 @ 15:42      Subjective: Patient seen and examined. No new events except as noted.   Patient seen earlier this AM. Family at the bedside  S/P TLSO brace delivery   D-dimer elevated, CT Angio ordered    REVIEW OF SYSTEMS:    CONSTITUTIONAL: Generalized weakness   EYES/ENT: No visual changes;  No vertigo or throat pain   NECK: No pain or stiffness  RESPIRATORY: No cough, wheezing, hemoptysis; No shortness of breath  CARDIOVASCULAR: No chest pain or palpitations  GASTROINTESTINAL: No abdominal or epigastric pain. No nausea, vomiting, or hematemesis; No diarrhea or constipation. No melena or hematochezia.  GENITOURINARY: No dysuria, frequency or hematuria  NEUROLOGICAL: No numbness or weakness  SKIN: No itching, burning, rashes, or lesions   All other review of systems is negative unless indicated above.    MEDICATIONS:  MEDICATIONS  (STANDING):  amLODIPine   Tablet 10 milliGRAM(s) Oral daily  atorvastatin 10 milliGRAM(s) Oral at bedtime  diclofenac 50 milliGRAM(s) Oral two times a day  finasteride 5 milliGRAM(s) Oral daily  heparin  Infusion.  Unit(s)/Hr (14 mL/Hr) IV Continuous <Continuous>  losartan 100 milliGRAM(s) Oral daily  magnesium oxide 400 milliGRAM(s) Oral daily  multivitamin 1 Tablet(s) Oral daily  pancrelipase  (CREON 36,000 Lipase Units) 1 Capsule(s) Oral three times a day with meals  pantoprazole    Tablet 40 milliGRAM(s) Oral before breakfast  polyethylene glycol 3350 17 Gram(s) Oral daily  pregabalin 50 milliGRAM(s) Oral two times a day  primidone 50 milliGRAM(s) Oral daily  senna 2 Tablet(s) Oral at bedtime  tamsulosin 0.4 milliGRAM(s) Oral at bedtime  traMADol 50 milliGRAM(s) Oral two times a day  traZODone 50 milliGRAM(s) Oral at bedtime      PHYSICAL EXAM:  T(C): 36.7 (05-10-23 @ 14:25), Max: 36.9 (05-10-23 @ 05:10)  HR: 81 (05-10-23 @ 14:25) (72 - 83)  BP: 102/65 (05-10-23 @ 14:25) (102/65 - 118/62)  RR: 18 (05-10-23 @ 14:25) (18 - 18)  SpO2: 92% (05-10-23 @ 05:10) (91% - 92%)  Wt(kg): --  I&O's Summary    09 May 2023 07:01  -  10 May 2023 07:00  --------------------------------------------------------  IN: 240 mL / OUT: 0 mL / NET: 240 mL          Appearance: Normal	  HEENT:  Eyes are open   Lymphatic: No lymphadenopathy   Cardiovascular: Normal S1 S2, no JVD  Respiratory: normal effort , clear  Gastrointestinal:  Soft, Non-tender  Skin: No rashes,  warm to touch  Psychiatry:  Mood & affect appropriate  Musculoskeletal: No edema        05-09-23 @ 07:01  -  05-10-23 @ 07:00  --------------------------------------------------------  IN: 240 mL / OUT: 0 mL / NET: 240 mL                                  12.1   6.07  )-----------( 235      ( 09 May 2023 10:19 )             36.9               05-09    137  |  101  |  16  ----------------------------<  85  4.2   |  24  |  0.82    Ca    8.9      09 May 2023 10:19                           Culture - Urine (05.07.23 @ 11:05)   Specimen Source: Clean Catch Clean Catch (Midstream)  Culture Results: No growth    < from: VA Duplex Lower Ext Vein Scan, Bilat (05.10.23 @ 12:41) >  IMPRESSION:    No acute DVT identified.    Postphlebitic changes, the residue of prior DVT affect the left femoral,   popliteal and calf veins.    NP Pedro informed    < end of copied text >      
Name of Patient : LESLI YANG  MRN: [id number]  Date of visit: 23 @ 16:37      Subjective: Patient seen and examined. No new events except as noted.   Patient seen earlier this AM. Grand daugther at the bedside providing translation. Family at the bedside  Patient and family deny any head strike. Report patient is deconditioned.     REVIEW OF SYSTEMS:    CONSTITUTIONAL: Generalized weakness   EYES/ENT: No visual changes;  No vertigo or throat pain   NECK: No pain or stiffness  RESPIRATORY: No cough, wheezing, hemoptysis; No shortness of breath  CARDIOVASCULAR: No chest pain or palpitations  GASTROINTESTINAL: No abdominal or epigastric pain. No nausea, vomiting, or hematemesis; No diarrhea or constipation. No melena or hematochezia.  GENITOURINARY: No dysuria, frequency or hematuria  NEUROLOGICAL: No numbness or weakness  SKIN: No itching, burning, rashes, or lesions   All other review of systems is negative unless indicated above.    MEDICATIONS:  MEDICATIONS  (STANDING):  amLODIPine   Tablet 10 milliGRAM(s) Oral daily  atorvastatin 10 milliGRAM(s) Oral at bedtime  diclofenac 50 milliGRAM(s) Oral two times a day  enoxaparin Injectable 40 milliGRAM(s) SubCutaneous every 24 hours  finasteride 5 milliGRAM(s) Oral daily  losartan 100 milliGRAM(s) Oral daily  magnesium oxide 400 milliGRAM(s) Oral daily  multivitamin 1 Tablet(s) Oral daily  pancrelipase  (CREON 36,000 Lipase Units) 1 Capsule(s) Oral three times a day with meals  pantoprazole    Tablet 40 milliGRAM(s) Oral before breakfast  polyethylene glycol 3350 17 Gram(s) Oral daily  pregabalin 50 milliGRAM(s) Oral two times a day  primidone 50 milliGRAM(s) Oral daily  senna 2 Tablet(s) Oral at bedtime  tamsulosin 0.4 milliGRAM(s) Oral at bedtime  traMADol 50 milliGRAM(s) Oral two times a day  traZODone 50 milliGRAM(s) Oral at bedtime      PHYSICAL EXAM:  T(C): 36.8 (23 @ 13:57), Max: 36.9 (23 @ 22:49)  HR: 73 (23 @ 13:57) (73 - 84)  BP: 124/64 (23 @ 13:57) (124/64 - 145/77)  RR: 18 (23 @ 13:57) (18 - 18)  SpO2: 91% (23 @ 13:57) (87% - 97%)  Wt(kg): --  I&O's Summary    07 May 2023 07:  -  08 May 2023 07:00  --------------------------------------------------------  IN: 0 mL / OUT: 200 mL / NET: -200 mL    08 May 2023 07:01  -  08 May 2023 16:37  --------------------------------------------------------  IN: 480 mL / OUT: 200 mL / NET: 280 mL      Height (cm): 167.6 ( 23:35)  Weight (kg): 79 ( 23:35)  BMI (kg/m2): 28.1 ( 23:35)  BSA (m2): 1.89 ( @ 23:35)    Appearance: Normal	  HEENT: Eyes are open   Lymphatic: No lymphadenopathy   Cardiovascular: Normal   Respiratory: normal effort , clear  Gastrointestinal:  Soft, Non-tender  Skin: No rashes,  warm to touch  Psychiatry:  Mood & affect appropriate  Musculoskeletal: No edema      23 @ 07:01  -  23 @ 07:00  --------------------------------------------------------  IN: 0 mL / OUT: 200 mL / NET: -200 mL    23 @ 07:01  -  23 @ 16:37  --------------------------------------------------------  IN: 480 mL / OUT: 200 mL / NET: 280 mL                                11.7   7.70  )-----------( 247      ( 07 May 2023 09:08 )             35.4                   135  |  102  |  15  ----------------------------<  109<H>  4.8   |  23  |  0.59    Ca    9.1      07 May 2023 09:08    TPro  6.4  /  Alb  3.7  /  TBili  0.6  /  DBili  x   /  AST  31  /  ALT  17  /  AlkPhos  70      PT/INR - ( 07 May 2023 09:08 )   PT: 12.7 sec;   INR: 1.09 ratio         PTT - ( 07 May 2023 09:08 )  PTT:29.4 sec                   Urinalysis Basic - ( 07 May 2023 11:05 )    Color: Yellow / Appearance: Clear / S.017 / pH: x  Gluc: x / Ketone: Negative  / Bili: Negative / Urobili: 2 mg/dL   Blood: x / Protein: Trace / Nitrite: Negative   Leuk Esterase: Negative / RBC: 3 /hpf / WBC 1 /HPF   Sq Epi: x / Non Sq Epi: x / Bacteria: Negative        Culture - Urine (23 @ 11:05)   Specimen Source: Clean Catch Clean Catch (Midstream)  Culture Results: No growth    < from: CT Lumbar Spine Reform No Cont (23 @ 10:09) >  IMPRESSION:    1. No acute fractures or dislocations.    2. Chronic healed left-sided L3 and L4 transverse process fractures.    3. Additional multilevel chronic compression deformities involving T2,   T4, T12, L1, L2, L3, and L4 which appears slightly more progressed when   compared with 2019.    4. Diffuse osteopenia.    5. Multilevel lumbar spondylosis.    < end of copied text >    < from: CT Cervical Spine No Cont (23 @ 10:00) >  IMPRESSION:    Cervical spine CT: No acute fractures or dislocations.    Multilevel cervical spondylosis.    2.4 cm right and 1.4 cm left thyroid nodules. Thyroid neoplasm is not   excluded. Recommend ultrasound correlation.    Head CT: No acute intracranial hemorrhage, mass effect, or shift of the   midline structures.      < end of copied text >      < from: CT Abdomen and Pelvis No Cont (23 @ 10:00) >    IMPRESSION:  No acute intrathoracic or intra-abdominal pathology.  Refer to dedicated lumbar and thoracic spine CT report for findings   regarding the spine.    < end of copied text >    
Name of Patient : LESLI YANG  MRN: [id number]  Date of visit: 05-09-23 @ 15:42      Subjective: Patient seen and examined. No new events except as noted.   Patient seen earlier this AM. Family at the bedside. Granddaughter provided translation via telephone.  Pending TLSO brace. Reports pain is better controlled.  Checking LE Duplex and D-Dimer    REVIEW OF SYSTEMS:    CONSTITUTIONAL: Generalized weakness   EYES/ENT: No visual changes;  No vertigo or throat pain   NECK: No pain or stiffness  RESPIRATORY: No cough, wheezing, hemoptysis; No shortness of breath  CARDIOVASCULAR: No chest pain or palpitations  GASTROINTESTINAL: No abdominal or epigastric pain. No nausea, vomiting, or hematemesis; No diarrhea or constipation. No melena or hematochezia.  GENITOURINARY: No dysuria, frequency or hematuria  NEUROLOGICAL: No numbness or weakness  SKIN: No itching, burning, rashes, or lesions   All other review of systems is negative unless indicated above.    MEDICATIONS:  MEDICATIONS  (STANDING):  amLODIPine   Tablet 10 milliGRAM(s) Oral daily  atorvastatin 10 milliGRAM(s) Oral at bedtime  diclofenac 50 milliGRAM(s) Oral two times a day  enoxaparin Injectable 40 milliGRAM(s) SubCutaneous every 24 hours  finasteride 5 milliGRAM(s) Oral daily  losartan 100 milliGRAM(s) Oral daily  magnesium oxide 400 milliGRAM(s) Oral daily  multivitamin 1 Tablet(s) Oral daily  pancrelipase  (CREON 36,000 Lipase Units) 1 Capsule(s) Oral three times a day with meals  pantoprazole    Tablet 40 milliGRAM(s) Oral before breakfast  polyethylene glycol 3350 17 Gram(s) Oral daily  pregabalin 50 milliGRAM(s) Oral two times a day  primidone 50 milliGRAM(s) Oral daily  senna 2 Tablet(s) Oral at bedtime  tamsulosin 0.4 milliGRAM(s) Oral at bedtime  traMADol 50 milliGRAM(s) Oral two times a day  traZODone 50 milliGRAM(s) Oral at bedtime      PHYSICAL EXAM:  T(C): 36.5 (05-09-23 @ 11:30), Max: 36.9 (05-08-23 @ 22:32)  HR: 76 (05-09-23 @ 11:30) (74 - 85)  BP: 126/76 (05-09-23 @ 11:30) (105/60 - 126/76)  RR: 18 (05-09-23 @ 11:30) (18 - 18)  SpO2: 89% (05-09-23 @ 11:30) (89% - 94%)  Wt(kg): --  I&O's Summary    08 May 2023 07:01  -  09 May 2023 07:00  --------------------------------------------------------  IN: 605 mL / OUT: 1000 mL / NET: -395 mL          Appearance: Normal	  HEENT: Eyes are open   Lymphatic: No lymphadenopathy   Cardiovascular: Normal    Respiratory: normal effort , clear  Gastrointestinal:  Soft, Non-tender  Skin: No rashes,  warm to touch  Psychiatry:  Mood & affect appropriate  Musculoskeletal: No edema          05-08-23 @ 07:01  -  05-09-23 @ 07:00  --------------------------------------------------------  IN: 605 mL / OUT: 1000 mL / NET: -395 mL                                12.1   6.07  )-----------( 235      ( 09 May 2023 10:19 )             36.9               05-09    137  |  101  |  16  ----------------------------<  85  4.2   |  24  |  0.82    Ca    8.9      09 May 2023 10:19                             Culture - Urine (05.07.23 @ 11:05)   Specimen Source: Clean Catch Clean Catch (Midstream)  Culture Results: No growth    < from: TTE Limited W or WO Ultrasound Enhancing Agent (05.09.23 @ 08:54) >  CONCLUSIONS:      1. Normal left ventricular cavity size. The left ventricular wall thickness is normal. The left ventricular systolic function is hyperdynamic with an ejection fraction visually estimated at 75 %. There are no regional wall motion abnormalities seen.   2. Right ventricle was the right ventricle is not well visualized.   3. The right ventricle is not well visualized. Mildly enlarged right ventricular cavity size, normal wall thickness and normal systolic function. The tricuspid annular plane systolic excursion (TAPSE) is 1.9 cm (normal >=1.7 cm).   4. Trace mitral regurgitation.   5. Trace aortic regurgitation.   6. Trileaflet aortic valve with normal systolic excursion.   7. Mild calcification of the aortic valve leaflets.   8. Compared to the transthoracic echocardiogram performed on 7/17/2019 results are similar on today's study.   9. Technically difficult image quality.    < end of copied text >

## 2023-05-12 NOTE — PROGRESS NOTE ADULT - PROVIDER SPECIALTY LIST ADULT
Internal Medicine
Orthopedics
Internal Medicine

## 2023-05-12 NOTE — PROGRESS NOTE ADULT - ASSESSMENT
89 yo m with h/o HTN, HLD, BPH, h/o DVT no longer on AC, chronic back pain with previous back surg brought in from home after a unwitnessed fall and back pain       Back pain.   - Progressive worsening function over past few days prior to arrival   - CT Spine without acute finding though some progression of previously seen compression deformities of multilevel vertebrae compared to previous with diffuse osteopenia.   - Pain control, PT eval and TLSO brace   - D/w family - would like pt to go to rehab   - Bowel regiment   - Fall precautions   - DC planning to rehab underway     Fall  - Denies head strike or LOC   - Head CT negative  - UCx neg   - Fall precautions  - TTE --> EF of 75%, Trace MR, Trace AR    Hypoxia, Wheezing   - Desaturated on RA   - Supplement O2 PRN to maintain adequate oxygenation   - H/O DVT, Duplex w/ Prior DVT residue, postphlebitic changes   - Elevated D-Dimer, Check CTA -- Neg for PE  - Vascular eval appreciated; No intervention at this time; Hold off on AC at this time   - Started on Duoneb; Monitor   - Check O2 saturation at rest and on RA  - Check RVP  - Wheezing improved. DC Planning to rehab underway. Rehab to follow up on possible O2 need.   - Monitor O2 saturation closely     HTN (hypertension).   - Cont with home BP meds  - monitor and titrate.    BPH without urinary obstruction.   - Cont with home regiment.   - 05/07 UCx negative   - monitor urine outpt closely     Thyroid nodule.   -  Previously seen thyroid nodules seen on CT scan   - No significant changes in size to previous  - Outpatient follow up with PCP for thyroid US as an outpatient         PPX  - DVT PPX --> Lovenox 40 Qd     Discussed with Attending and ACP.   Discussed with granddaughter Emilia via telephone at [phone] 05/12

## 2023-05-12 NOTE — DISCHARGE NOTE NURSING/CASE MANAGEMENT/SOCIAL WORK - NSDCFUADDAPPT_GEN_ALL_CORE_FT
APPTS ARE READY TO BE MADE: [x] YES    Best Family or Patient Contact (if needed):    Additional Information about above appointments (if needed):    1:   2:   3:     Other comments or requests:       Patient is being discharged to rehab. Patient/caregiver will arrange follow up care appointments.

## 2023-05-12 NOTE — DISCHARGE NOTE NURSING/CASE MANAGEMENT/SOCIAL WORK - NSDCPEFALRISK_GEN_ALL_CORE
For information on Fall & Injury Prevention, visit: [email or web address] OR  [email or web address] OR  https://www.cdc.gov/steadi/patient.html

## 2023-05-12 NOTE — DISCHARGE NOTE NURSING/CASE MANAGEMENT/SOCIAL WORK - PATIENT PORTAL LINK FT
You can access the FollowMyHealth Patient Portal offered by [facility] by registering at the following website: [email or web address]. By joining [facility] FollowMyHealth portal, you will also be able to view your health information using other applications (apps) compatible with our system.

## 2023-07-19 NOTE — OCCUPATIONAL THERAPY INITIAL EVALUATION ADULT - PREDICTED DURATION OF THERAPY (DAYS/WKS), OT EVAL
4 weeks Previously Declined (within the last year) Lactation Consult/Circumcision, per parent request

## 2023-08-06 NOTE — DISCHARGE NOTE NURSING/CASE MANAGEMENT/SOCIAL WORK - NSDCDPATPORTLINK_GEN_ALL_CORE
Chief Complaint   Patient presents with    Psych Eval     BIBA from [facility]. Pt has schizophrenic episodes. He's not been taking his medications.   (-) SI/HI    BP (!) 148/95   Pulse 77   Temp 36.4 °C (97.5 °F) (Temporal)   Resp 20   Ht 1.829 m (6')   Wt 68 kg (150 lb)   SpO2 95%   BMI 20.34 kg/m²     
You can access the [facility] Patient Portal, offered by [facility], by registering with the following website: [email or web address]

## 2023-10-26 NOTE — PROGRESS NOTE ADULT - REASON FOR ADMISSION
Speech Therapy    Patient not seen in therapy today.    Re-attempt plan: tomorrow or per established plan of care    Additional details:  Dr. King agreeable to video swallow study as well as guardian Nicol.  Study tentatively scheduled for 11:30 am tomorrow morning.       OBJECTIVE                       Documented in the chart in the following areas: Assessment.        Therapy procedure time and total treatment time can be found documented on the Time Entry flowsheet  
back pain

## 2024-08-23 NOTE — ED ADULT NURSE NOTE - NS ED NOTE ABUSE SUSPICION NEGLECT YN
[de-identified] : No